# Patient Record
Sex: MALE | Race: WHITE | NOT HISPANIC OR LATINO | Employment: FULL TIME | URBAN - METROPOLITAN AREA
[De-identification: names, ages, dates, MRNs, and addresses within clinical notes are randomized per-mention and may not be internally consistent; named-entity substitution may affect disease eponyms.]

---

## 2019-12-06 ENCOUNTER — CLINICAL SUPPORT (OUTPATIENT)
Dept: CARDIOLOGY | Facility: HOSPITAL | Age: 69
DRG: 246 | End: 2019-12-06
Attending: FAMILY MEDICINE
Payer: MEDICARE

## 2019-12-06 ENCOUNTER — HOSPITAL ENCOUNTER (INPATIENT)
Facility: HOSPITAL | Age: 69
LOS: 4 days | Discharge: HOME | DRG: 246 | End: 2019-12-10
Attending: EMERGENCY MEDICINE | Admitting: FAMILY MEDICINE
Payer: MEDICARE

## 2019-12-06 VITALS — BODY MASS INDEX: 44.1 KG/M2 | HEIGHT: 71 IN | WEIGHT: 315 LBS

## 2019-12-06 DIAGNOSIS — R07.9 CHEST PAIN: ICD-10-CM

## 2019-12-06 DIAGNOSIS — I24.9 ACS (ACUTE CORONARY SYNDROME): ICD-10-CM

## 2019-12-06 DIAGNOSIS — I21.4 NSTEMI (NON-ST ELEVATED MYOCARDIAL INFARCTION): ICD-10-CM

## 2019-12-06 DIAGNOSIS — R07.9 CHEST PAIN, UNSPECIFIED TYPE: Primary | ICD-10-CM

## 2019-12-06 PROBLEM — E78.5 HYPERLIPIDEMIA: Chronic | Status: ACTIVE | Noted: 2019-12-06

## 2019-12-06 PROBLEM — K57.90 DIVERTICULOSIS: Chronic | Status: ACTIVE | Noted: 2019-12-06

## 2019-12-06 PROBLEM — G47.33 OSA ON CPAP: Chronic | Status: ACTIVE | Noted: 2019-12-06

## 2019-12-06 PROBLEM — E11.9 DM (DIABETES MELLITUS), TYPE 2: Chronic | Status: ACTIVE | Noted: 2019-12-06

## 2019-12-06 PROBLEM — G89.29 CHRONIC LOWER BACK PAIN: Chronic | Status: ACTIVE | Noted: 2019-12-06

## 2019-12-06 PROBLEM — I87.2 VENOUS INSUFFICIENCY: Chronic | Status: ACTIVE | Noted: 2019-12-06

## 2019-12-06 PROBLEM — Z95.1 S/P CABG (CORONARY ARTERY BYPASS GRAFT): Chronic | Status: ACTIVE | Noted: 2019-12-06

## 2019-12-06 PROBLEM — E66.01 MORBID OBESITY: Chronic | Status: ACTIVE | Noted: 2019-12-06

## 2019-12-06 PROBLEM — M54.50 CHRONIC LOWER BACK PAIN: Chronic | Status: ACTIVE | Noted: 2019-12-06

## 2019-12-06 PROBLEM — I10 HYPERTENSION: Chronic | Status: ACTIVE | Noted: 2019-12-06

## 2019-12-06 PROBLEM — I25.10 CAD (CORONARY ARTERY DISEASE): Chronic | Status: ACTIVE | Noted: 2019-12-06

## 2019-12-06 PROBLEM — Z90.5 H/O RIGHT NEPHRECTOMY: Chronic | Status: ACTIVE | Noted: 2019-12-06

## 2019-12-06 LAB
ALBUMIN SERPL BCP-MCNC: 4.1 G/DL (ref 3.5–5.2)
ALP SERPL-CCNC: 44 U/L (ref 55–135)
ALT SERPL W/O P-5'-P-CCNC: 22 U/L (ref 10–44)
ANION GAP SERPL CALC-SCNC: 9 MMOL/L (ref 8–16)
AORTIC ROOT ANNULUS: 3.42 CM
AORTIC VALVE CUSP SEPERATION: 2.2 CM
AST SERPL-CCNC: 18 U/L (ref 10–40)
AV INDEX (PROSTH): 0.88
AV MEAN GRADIENT: 7 MMHG
AV PEAK GRADIENT: 13 MMHG
AV VALVE AREA: 4.27 CM2
AV VELOCITY RATIO: 80.42
BASOPHILS # BLD AUTO: 0.06 K/UL (ref 0–0.2)
BASOPHILS NFR BLD: 0.6 % (ref 0–1.9)
BILIRUB SERPL-MCNC: 0.9 MG/DL (ref 0.1–1)
BNP SERPL-MCNC: 32 PG/ML (ref 0–99)
BSA FOR ECHO PROCEDURE: 2.69 M2
BUN SERPL-MCNC: 16 MG/DL (ref 8–23)
CALCIUM SERPL-MCNC: 9.2 MG/DL (ref 8.7–10.5)
CHLORIDE SERPL-SCNC: 102 MMOL/L (ref 95–110)
CO2 SERPL-SCNC: 24 MMOL/L (ref 23–29)
CREAT SERPL-MCNC: 0.9 MG/DL (ref 0.5–1.4)
CV ECHO LV RWT: 0.54 CM
DIFFERENTIAL METHOD: ABNORMAL
DOP CALC AO PEAK VEL: 1.78 M/S
DOP CALC AO VTI: 36.07 CM
DOP CALC LVOT AREA: 4.9 CM2
DOP CALC LVOT DIAMETER: 2.49 CM
DOP CALC LVOT PEAK VEL: 143.15 M/S
DOP CALC LVOT STROKE VOLUME: 154.09 CM3
DOP CALCLVOT PEAK VEL VTI: 31.66 CM
E WAVE DECELERATION TIME: 219.63 MSEC
E/A RATIO: 1.14
E/E' RATIO: 10.47 M/S
ECHO LV POSTERIOR WALL: 1.41 CM (ref 0.6–1.1)
EOSINOPHIL # BLD AUTO: 0.3 K/UL (ref 0–0.5)
EOSINOPHIL NFR BLD: 2.7 % (ref 0–8)
ERYTHROCYTE [DISTWIDTH] IN BLOOD BY AUTOMATED COUNT: 12.8 % (ref 11.5–14.5)
EST. GFR  (AFRICAN AMERICAN): >60 ML/MIN/1.73 M^2
EST. GFR  (NON AFRICAN AMERICAN): >60 ML/MIN/1.73 M^2
FRACTIONAL SHORTENING: 30 % (ref 28–44)
GLUCOSE SERPL-MCNC: 157 MG/DL (ref 70–110)
GLUCOSE SERPL-MCNC: 161 MG/DL (ref 70–110)
GLUCOSE SERPL-MCNC: 173 MG/DL (ref 70–110)
GLUCOSE SERPL-MCNC: 195 MG/DL (ref 70–110)
HCT VFR BLD AUTO: 34 % (ref 40–54)
HGB BLD-MCNC: 11.6 G/DL (ref 14–18)
IMM GRANULOCYTES # BLD AUTO: 0.07 K/UL (ref 0–0.04)
IMM GRANULOCYTES NFR BLD AUTO: 0.7 % (ref 0–0.5)
INR PPP: 1
INTERVENTRICULAR SEPTUM: 1.41 CM (ref 0.6–1.1)
IVRT: 78.73 MSEC
LEFT ATRIUM SIZE: 5.11 CM
LEFT INTERNAL DIMENSION IN SYSTOLE: 3.66 CM (ref 2.1–4)
LEFT VENTRICLE MASS INDEX: 123 G/M2
LEFT VENTRICULAR INTERNAL DIMENSION IN DIASTOLE: 5.23 CM (ref 3.5–6)
LEFT VENTRICULAR MASS: 315.6 G
LV LATERAL E/E' RATIO: 8.9 M/S
LV SEPTAL E/E' RATIO: 12.71 M/S
LYMPHOCYTES # BLD AUTO: 2.1 K/UL (ref 1–4.8)
LYMPHOCYTES NFR BLD: 21.4 % (ref 18–48)
MCH RBC QN AUTO: 32.3 PG (ref 27–31)
MCHC RBC AUTO-ENTMCNC: 34.1 G/DL (ref 32–36)
MCV RBC AUTO: 95 FL (ref 82–98)
MONOCYTES # BLD AUTO: 0.8 K/UL (ref 0.3–1)
MONOCYTES NFR BLD: 7.6 % (ref 4–15)
MV PEAK A VEL: 0.78 M/S
MV PEAK E VEL: 0.89 M/S
NEUTROPHILS # BLD AUTO: 6.7 K/UL (ref 1.8–7.7)
NEUTROPHILS NFR BLD: 67 % (ref 38–73)
NRBC BLD-RTO: 0 /100 WBC
PISA TR MAX VEL: 2.48 M/S
PLATELET # BLD AUTO: 216 K/UL (ref 150–350)
PMV BLD AUTO: 10.5 FL (ref 9.2–12.9)
POTASSIUM SERPL-SCNC: 4.4 MMOL/L (ref 3.5–5.1)
PROT SERPL-MCNC: 7.1 G/DL (ref 6–8.4)
PROTHROMBIN TIME: 13 SEC (ref 10.6–14.8)
PV PEAK VELOCITY: 134.93 CM/S
RBC # BLD AUTO: 3.59 M/UL (ref 4.6–6.2)
RIGHT VENTRICULAR END-DIASTOLIC DIMENSION: 200 CM
SODIUM SERPL-SCNC: 135 MMOL/L (ref 136–145)
T4 FREE SERPL-MCNC: 0.56 NG/DL (ref 0.71–1.51)
TDI LATERAL: 0.1 M/S
TDI SEPTAL: 0.07 M/S
TDI: 0.09 M/S
TR MAX PG: 25 MMHG
TROPONIN I SERPL DL<=0.01 NG/ML-MCNC: 0.17 NG/ML (ref 0.02–0.04)
TROPONIN I SERPL DL<=0.01 NG/ML-MCNC: 0.55 NG/ML (ref 0.02–0.04)
TROPONIN I SERPL DL<=0.01 NG/ML-MCNC: 0.56 NG/ML (ref 0.02–0.04)
TSH SERPL DL<=0.005 MIU/L-ACNC: 2.58 UIU/ML (ref 0.34–5.6)
WBC # BLD AUTO: 9.95 K/UL (ref 3.9–12.7)

## 2019-12-06 PROCEDURE — 80053 COMPREHEN METABOLIC PANEL: CPT

## 2019-12-06 PROCEDURE — 99285 EMERGENCY DEPT VISIT HI MDM: CPT | Mod: 25

## 2019-12-06 PROCEDURE — 84484 ASSAY OF TROPONIN QUANT: CPT

## 2019-12-06 PROCEDURE — C9399 UNCLASSIFIED DRUGS OR BIOLOG: HCPCS | Performed by: FAMILY MEDICINE

## 2019-12-06 PROCEDURE — 36415 COLL VENOUS BLD VENIPUNCTURE: CPT

## 2019-12-06 PROCEDURE — 94761 N-INVAS EAR/PLS OXIMETRY MLT: CPT

## 2019-12-06 PROCEDURE — 25000003 PHARM REV CODE 250: Performed by: EMERGENCY MEDICINE

## 2019-12-06 PROCEDURE — 83880 ASSAY OF NATRIURETIC PEPTIDE: CPT

## 2019-12-06 PROCEDURE — 63600175 PHARM REV CODE 636 W HCPCS: Performed by: FAMILY MEDICINE

## 2019-12-06 PROCEDURE — 93005 ELECTROCARDIOGRAM TRACING: CPT

## 2019-12-06 PROCEDURE — 63600175 PHARM REV CODE 636 W HCPCS: Performed by: EMERGENCY MEDICINE

## 2019-12-06 PROCEDURE — 85025 COMPLETE CBC W/AUTO DIFF WBC: CPT

## 2019-12-06 PROCEDURE — 84439 ASSAY OF FREE THYROXINE: CPT

## 2019-12-06 PROCEDURE — 25000003 PHARM REV CODE 250: Performed by: FAMILY MEDICINE

## 2019-12-06 PROCEDURE — 84484 ASSAY OF TROPONIN QUANT: CPT | Mod: 91

## 2019-12-06 PROCEDURE — 93306 TTE W/DOPPLER COMPLETE: CPT

## 2019-12-06 PROCEDURE — 85610 PROTHROMBIN TIME: CPT

## 2019-12-06 PROCEDURE — 84443 ASSAY THYROID STIM HORMONE: CPT

## 2019-12-06 PROCEDURE — 21400001 HC TELEMETRY ROOM

## 2019-12-06 PROCEDURE — 83036 HEMOGLOBIN GLYCOSYLATED A1C: CPT

## 2019-12-06 PROCEDURE — 99900035 HC TECH TIME PER 15 MIN (STAT)

## 2019-12-06 RX ORDER — ERGOCALCIFEROL 1.25 MG/1
50000 CAPSULE ORAL
COMMUNITY

## 2019-12-06 RX ORDER — DOCUSATE SODIUM 100 MG/1
100 CAPSULE, LIQUID FILLED ORAL DAILY PRN
Status: DISCONTINUED | OUTPATIENT
Start: 2019-12-06 | End: 2019-12-10 | Stop reason: HOSPADM

## 2019-12-06 RX ORDER — ENOXAPARIN SODIUM 100 MG/ML
40 INJECTION SUBCUTANEOUS EVERY 12 HOURS
Status: DISCONTINUED | OUTPATIENT
Start: 2019-12-06 | End: 2019-12-10 | Stop reason: HOSPADM

## 2019-12-06 RX ORDER — POTASSIUM CHLORIDE 20 MEQ/15ML
40 SOLUTION ORAL
Status: DISCONTINUED | OUTPATIENT
Start: 2019-12-06 | End: 2019-12-10 | Stop reason: HOSPADM

## 2019-12-06 RX ORDER — LISINOPRIL 10 MG/1
10 TABLET ORAL DAILY
Status: DISCONTINUED | OUTPATIENT
Start: 2019-12-06 | End: 2019-12-10 | Stop reason: HOSPADM

## 2019-12-06 RX ORDER — FERROUS SULFATE 325(65) MG
325 TABLET ORAL DAILY
COMMUNITY

## 2019-12-06 RX ORDER — DOCUSATE SODIUM 100 MG/1
100 CAPSULE, LIQUID FILLED ORAL DAILY PRN
COMMUNITY
Start: 2018-01-08

## 2019-12-06 RX ORDER — SODIUM,POTASSIUM PHOSPHATES 280-250MG
2 POWDER IN PACKET (EA) ORAL
Status: DISCONTINUED | OUTPATIENT
Start: 2019-12-06 | End: 2019-12-10 | Stop reason: HOSPADM

## 2019-12-06 RX ORDER — IBUPROFEN 200 MG
24 TABLET ORAL
Status: DISCONTINUED | OUTPATIENT
Start: 2019-12-06 | End: 2019-12-10 | Stop reason: HOSPADM

## 2019-12-06 RX ORDER — GLIMEPIRIDE 4 MG/1
4 TABLET ORAL 2 TIMES DAILY
COMMUNITY
Start: 2019-08-05

## 2019-12-06 RX ORDER — PRAVASTATIN SODIUM 20 MG/1
20 TABLET ORAL DAILY
COMMUNITY
Start: 2019-08-05

## 2019-12-06 RX ORDER — PANTOPRAZOLE SODIUM 40 MG/1
40 TABLET, DELAYED RELEASE ORAL
Status: DISCONTINUED | OUTPATIENT
Start: 2019-12-06 | End: 2019-12-10 | Stop reason: HOSPADM

## 2019-12-06 RX ORDER — GLUCAGON 1 MG
1 KIT INJECTION
Status: DISCONTINUED | OUTPATIENT
Start: 2019-12-06 | End: 2019-12-10 | Stop reason: HOSPADM

## 2019-12-06 RX ORDER — ENOXAPARIN SODIUM 100 MG/ML
40 INJECTION SUBCUTANEOUS EVERY 24 HOURS
Status: DISCONTINUED | OUTPATIENT
Start: 2019-12-06 | End: 2019-12-06

## 2019-12-06 RX ORDER — ACETAMINOPHEN 325 MG/1
650 TABLET ORAL EVERY 8 HOURS PRN
Status: DISCONTINUED | OUTPATIENT
Start: 2019-12-06 | End: 2019-12-08

## 2019-12-06 RX ORDER — IBUPROFEN 200 MG
16 TABLET ORAL
Status: DISCONTINUED | OUTPATIENT
Start: 2019-12-06 | End: 2019-12-10 | Stop reason: HOSPADM

## 2019-12-06 RX ORDER — GLUCOSAMINE/CHONDR SU A SOD 167-133 MG
500 CAPSULE ORAL DAILY
COMMUNITY

## 2019-12-06 RX ORDER — ONDANSETRON 2 MG/ML
4 INJECTION INTRAMUSCULAR; INTRAVENOUS EVERY 8 HOURS PRN
Status: DISCONTINUED | OUTPATIENT
Start: 2019-12-06 | End: 2019-12-10 | Stop reason: HOSPADM

## 2019-12-06 RX ORDER — ASPIRIN 325 MG
325 TABLET ORAL DAILY
Status: ON HOLD | COMMUNITY
End: 2019-12-10 | Stop reason: HOSPADM

## 2019-12-06 RX ORDER — INSULIN GLARGINE 100 [IU]/ML
50 INJECTION, SOLUTION SUBCUTANEOUS NIGHTLY
COMMUNITY
Start: 2019-08-06

## 2019-12-06 RX ORDER — SODIUM CHLORIDE 450 MG/100ML
1000 INJECTION, SOLUTION INTRAVENOUS
Status: COMPLETED | OUTPATIENT
Start: 2019-12-06 | End: 2019-12-06

## 2019-12-06 RX ORDER — TRAZODONE HYDROCHLORIDE 50 MG/1
50 TABLET ORAL NIGHTLY
COMMUNITY

## 2019-12-06 RX ORDER — DOXEPIN 6 MG/1
6 TABLET, FILM COATED ORAL NIGHTLY PRN
COMMUNITY

## 2019-12-06 RX ORDER — PRAVASTATIN SODIUM 20 MG/1
20 TABLET ORAL NIGHTLY
Status: DISCONTINUED | OUTPATIENT
Start: 2019-12-06 | End: 2019-12-10 | Stop reason: HOSPADM

## 2019-12-06 RX ORDER — LANOLIN ALCOHOL/MO/W.PET/CERES
800 CREAM (GRAM) TOPICAL
Status: DISCONTINUED | OUTPATIENT
Start: 2019-12-06 | End: 2019-12-10 | Stop reason: HOSPADM

## 2019-12-06 RX ORDER — HYDROCODONE BITARTRATE AND ACETAMINOPHEN 7.5; 325 MG/1; MG/1
1 TABLET ORAL EVERY 12 HOURS PRN
Status: DISCONTINUED | OUTPATIENT
Start: 2019-12-06 | End: 2019-12-08

## 2019-12-06 RX ORDER — POTASSIUM CHLORIDE 20 MEQ/15ML
20 SOLUTION ORAL
Status: DISCONTINUED | OUTPATIENT
Start: 2019-12-06 | End: 2019-12-10 | Stop reason: HOSPADM

## 2019-12-06 RX ORDER — LISINOPRIL 10 MG/1
10 TABLET ORAL DAILY
Status: ON HOLD | COMMUNITY
Start: 2019-08-05 | End: 2019-12-10 | Stop reason: HOSPADM

## 2019-12-06 RX ORDER — INSULIN ASPART 100 [IU]/ML
1-10 INJECTION, SOLUTION INTRAVENOUS; SUBCUTANEOUS
Status: DISCONTINUED | OUTPATIENT
Start: 2019-12-06 | End: 2019-12-10 | Stop reason: HOSPADM

## 2019-12-06 RX ORDER — SODIUM CHLORIDE 0.9 % (FLUSH) 0.9 %
2 SYRINGE (ML) INJECTION
Status: DISCONTINUED | OUTPATIENT
Start: 2019-12-06 | End: 2019-12-10 | Stop reason: HOSPADM

## 2019-12-06 RX ORDER — IPRATROPIUM BROMIDE AND ALBUTEROL SULFATE 2.5; .5 MG/3ML; MG/3ML
3 SOLUTION RESPIRATORY (INHALATION) EVERY 6 HOURS PRN
Status: DISCONTINUED | OUTPATIENT
Start: 2019-12-06 | End: 2019-12-10 | Stop reason: HOSPADM

## 2019-12-06 RX ORDER — NAPROXEN SODIUM 220 MG/1
324 TABLET, FILM COATED ORAL ONCE
Status: DISCONTINUED | OUTPATIENT
Start: 2019-12-06 | End: 2019-12-06

## 2019-12-06 RX ORDER — ASPIRIN 325 MG
325 TABLET ORAL DAILY
Status: DISCONTINUED | OUTPATIENT
Start: 2019-12-07 | End: 2019-12-10 | Stop reason: HOSPADM

## 2019-12-06 RX ORDER — POLYETHYLENE GLYCOL 3350 17 G/17G
17 POWDER, FOR SOLUTION ORAL DAILY
Status: DISCONTINUED | OUTPATIENT
Start: 2019-12-06 | End: 2019-12-10 | Stop reason: HOSPADM

## 2019-12-06 RX ORDER — PHENOL/SODIUM PHENOLATE
20 AEROSOL, SPRAY (ML) MUCOUS MEMBRANE DAILY
COMMUNITY
Start: 2019-08-05

## 2019-12-06 RX ORDER — ASPIRIN 325 MG
325 TABLET ORAL
Status: COMPLETED | OUTPATIENT
Start: 2019-12-06 | End: 2019-12-06

## 2019-12-06 RX ORDER — HYDROCODONE BITARTRATE AND ACETAMINOPHEN 7.5; 325 MG/1; MG/1
1 TABLET ORAL EVERY 12 HOURS PRN
COMMUNITY

## 2019-12-06 RX ORDER — ACETAMINOPHEN 325 MG/1
650 TABLET ORAL EVERY 4 HOURS PRN
Status: DISCONTINUED | OUTPATIENT
Start: 2019-12-06 | End: 2019-12-08

## 2019-12-06 RX ADMIN — NITROGLYCERIN 0.5 INCH: 20 OINTMENT TOPICAL at 09:12

## 2019-12-06 RX ADMIN — HYDROCODONE BITARTRATE AND ACETAMINOPHEN 1 TABLET: 7.5; 325 TABLET ORAL at 09:12

## 2019-12-06 RX ADMIN — ASPIRIN 325 MG ORAL TABLET 325 MG: 325 PILL ORAL at 09:12

## 2019-12-06 RX ADMIN — POLYETHYLENE GLYCOL 3350 17 G: 17 POWDER, FOR SOLUTION ORAL at 03:12

## 2019-12-06 RX ADMIN — PRAVASTATIN SODIUM 20 MG: 20 TABLET ORAL at 09:12

## 2019-12-06 RX ADMIN — LISINOPRIL 10 MG: 10 TABLET ORAL at 03:12

## 2019-12-06 RX ADMIN — INSULIN ASPART 2 UNITS: 100 INJECTION, SOLUTION INTRAVENOUS; SUBCUTANEOUS at 04:12

## 2019-12-06 RX ADMIN — SODIUM CHLORIDE 1000 ML: 0.45 INJECTION, SOLUTION INTRAVENOUS at 09:12

## 2019-12-06 RX ADMIN — PANTOPRAZOLE SODIUM 40 MG: 40 TABLET, DELAYED RELEASE ORAL at 03:12

## 2019-12-06 RX ADMIN — ENOXAPARIN SODIUM 40 MG: 100 INJECTION SUBCUTANEOUS at 09:12

## 2019-12-06 RX ADMIN — INSULIN ASPART 1 UNITS: 100 INJECTION, SOLUTION INTRAVENOUS; SUBCUTANEOUS at 09:12

## 2019-12-06 RX ADMIN — INSULIN DETEMIR 10 UNITS: 100 INJECTION, SOLUTION SUBCUTANEOUS at 09:12

## 2019-12-06 NOTE — PROGRESS NOTES
Pharmacist Renal Dose Adjustment Note    Pelon Lazo is a 69 y.o. male being treated with the medication Lovenox    Patient Data:    Vital Signs (Most Recent):  Temp: 98.2 °F (36.8 °C) (12/06/19 0828)  Pulse: 70 (12/06/19 0952)  Resp: 16 (12/06/19 0952)  BP: (!) 123/58 (12/06/19 0931)  SpO2: 98 % (12/06/19 0952)   Vital Signs (72h Range):  Temp:  [98.2 °F (36.8 °C)]   Pulse:  [68-85]   Resp:  [16-34]   BP: (110-131)/(56-63)   SpO2:  [93 %-99 %]      Recent Labs   Lab 12/06/19 0656   CREATININE 0.9     Serum creatinine: 0.9 mg/dL 12/06/19 0656  Estimated creatinine clearance: 117.9 mL/min    Medication: Lovenox 40 mg SQ dose: every 24 hours frequency will be changed to medication: Lovenox 40 mg SQ dose: every 12 hours frequency for BMI > 40.    Pharmacist's Name: Leonila Glover  Pharmacist's Extension: [phone]

## 2019-12-06 NOTE — Clinical Note
Catheter is inserted into the ostial  SVG . Angiography performed of the right coronary arteries in multiple views. Angiography performed via power injection with 6 mL contrast at 3 mL/s. SVG to OM1, giving collaterals to the RCA

## 2019-12-06 NOTE — ASSESSMENT & PLAN NOTE
Trend CE  ASA, statin  Lipid, HbA1C, TSH/T4 pending  2D echo pending  Cardio consulted, Dr. Mckeon.  Case discussed with Dr. Mckeon  cw home meds  CPAP qhs

## 2019-12-06 NOTE — HPI
69-year-old male history CAD S/P CABG (2001), morbid obesity, GABBY on CPAP, s/p right nephrectomy, type 2 diabetes comes in for chest pain. Patient reports that he was in his usual health yesterday evening without acute signs of illness.  He suddenly woke up about 3:00 a.m. this morning with 5/10 left-sided nonradiating sharp chest pain that lasted for several minutes.  No aggravating factors.  Reports that pain mildly improved we stood up and went to the bathroom.  Endorses some shortness of breath with the chest pain however patient is on CPAP at night and has baseline shortness of breath when he removes his CPAP. Patient was able to go back to bed if fall back asleep after several minutes.  Reports similar symptoms about 4:00 a.m. in which he trended complex asleep again.  Symptoms occurred again at 5:00 a.m. in patient became concerned and came to the ED for further evaluation.  Of note, patient reports that he does not have hypertension, hyperlipidemia.  Patient takes blood pressure medications for cardiac and renal protective.  Patient is not taking a statin over 2 months because he forgot to  a refill.    In the ED, patient's symptoms resolved prior to come to ED.  Has that report chest pain since being in the ED.  Patient received aspirin and nitro paste.  Cardiology, Dr. Mckeon was consulted.  Initial troponins were 0.171.  EKG found no acute ST elevation or depression.

## 2019-12-06 NOTE — ED PROVIDER NOTES
Encounter Date: 12/6/2019       History     Chief Complaint   Patient presents with    Chest Pain     69-year-old male who has a history of a sleep apnea on CPAP, coronary artery disease at and 2 vessel CABG in 2001, and who had renal CA with a partial right 9 nephrectomy, presents emergency room with a history that he was awakened from sleep at 3:30 a.m. this morning with left precordial chest pain he describes as tight and sharp in nature.  No radiation to his pain. No similar past pain.  He did not take anything for the pain thus far.  The patient did have some shortness of breath but no nausea vomiting or diaphoresis.  He denies any changes recently in his exercise tolerance.  No fever chills or cough.        Review of patient's allergies indicates:  No Known Allergies  No past medical history on file.  No past surgical history on file.  No family history on file.  Social History     Tobacco Use    Smoking status: Not on file   Substance Use Topics    Alcohol use: Not on file    Drug use: Not on file     Review of Systems   Constitutional: Negative for diaphoresis and fever.   HENT: Negative for congestion, ear pain, rhinorrhea, sinus pressure, sinus pain, sore throat and trouble swallowing.    Eyes: Negative for pain.   Respiratory: Positive for shortness of breath. Negative for cough and wheezing.    Cardiovascular: Negative for chest pain and palpitations.   Gastrointestinal: Negative for abdominal pain, constipation, diarrhea, nausea and vomiting.   Genitourinary: Negative for difficulty urinating, flank pain, frequency and hematuria.   Skin: Negative for pallor and rash.   Neurological: Negative for headaches.   All other systems reviewed and are negative.      Physical Exam     Initial Vitals   BP Pulse Resp Temp SpO2   12/06/19 0646 12/06/19 0646 12/06/19 0646 12/06/19 0828 12/06/19 0646   (!) 130/59 85 20 98.2 °F (36.8 °C) 99 %      MAP       --                Physical Exam    Constitutional: He  appears well-developed and well-nourished. He is not diaphoretic. No distress.   HENT:   Head: Normocephalic and atraumatic.   Right Ear: External ear normal.   Left Ear: External ear normal.   Nose: Nose normal.   Mouth/Throat: Oropharynx is clear and moist.   Eyes: Conjunctivae and EOM are normal. Pupils are equal, round, and reactive to light. Right eye exhibits no discharge. Left eye exhibits no discharge. No scleral icterus.   Neck: Normal range of motion. Neck supple. No tracheal deviation present. No JVD present.   Cardiovascular: Normal rate, regular rhythm, normal heart sounds and intact distal pulses. Exam reveals no gallop and no friction rub.    No murmur heard.  Pulmonary/Chest: Breath sounds normal. No respiratory distress. He has no wheezes. He has no rhonchi. He has no rales. He exhibits no tenderness.   Abdominal: Soft. Bowel sounds are normal. He exhibits no distension and no mass. There is no tenderness. There is no rebound and no guarding.   Genitourinary: Penis normal.   Musculoskeletal: Normal range of motion. He exhibits no edema or tenderness.   Lymphadenopathy:     He has no cervical adenopathy.   Neurological: He is alert and oriented to person, place, and time. He has normal strength and normal reflexes. No cranial nerve deficit or sensory deficit. GCS score is 15. GCS eye subscore is 4. GCS verbal subscore is 5. GCS motor subscore is 6.   Skin: Skin is warm and dry. Capillary refill takes less than 2 seconds. No rash noted. No erythema. No pallor.   Psychiatric: He has a normal mood and affect. His behavior is normal. Judgment and thought content normal.         ED Course   Procedures  Labs Reviewed   CBC W/ AUTO DIFFERENTIAL - Abnormal; Notable for the following components:       Result Value    RBC 3.59 (*)     Hemoglobin 11.6 (*)     Hematocrit 34.0 (*)     Mean Corpuscular Hemoglobin 32.3 (*)     Immature Granulocytes 0.7 (*)     Immature Grans (Abs) 0.07 (*)     All other components  within normal limits   COMPREHENSIVE METABOLIC PANEL - Abnormal; Notable for the following components:    Sodium 135 (*)     Glucose 195 (*)     Alkaline Phosphatase 44 (*)     All other components within normal limits   TROPONIN I - Abnormal; Notable for the following components:    Troponin I 0.171 (*)     All other components within normal limits    Narrative:     Trop   result(s) called and verbal readback obtained from Marguerite Santillan RN/ER by DWAYNE 12/06/2019 08:15   B-TYPE NATRIURETIC PEPTIDE   PROTIME-INR        ECG Results          EKG 12-lead (In process)  Result time 12/06/19 07:15:05    In process by Interface, Lab In [facility] (12/06/19 07:15:05)                 Narrative:    Test Reason : R07.9,    Vent. Rate : 071 BPM     Atrial Rate : 071 BPM     P-R Int : 180 ms          QRS Dur : 100 ms      QT Int : 446 ms       P-R-T Axes : 039 015 081 degrees     QTc Int : 484 ms    Normal sinus rhythm  Nonspecific ST and T wave abnormality  Prolonged QT  Abnormal ECG  No previous ECGs available    Referred By: [id number]   SELF           Confirmed By:                             Imaging Results          X-Ray Chest AP Portable (Final result)  Result time 12/06/19 07:46:47    Final result by Quinton Gonzalez MD (12/06/19 07:46:47)                 Impression:      Stable chest radiograph.  No acute pulmonary process.      Electronically signed by: Quinton Gonzalez MD  Date:    12/06/2019  Time:    07:46             Narrative:    EXAMINATION:  XR CHEST AP PORTABLE    CLINICAL HISTORY:  cest pain;    COMPARISON:  04/11/2016    FINDINGS:  Cardiac silhouette size is borderline enlarged and stable from prior.  Mildly prominent central pulmonary vascularity.  Patient is status post median sternotomy.    No confluent airspace disease.  No large pleural effusion or pneumothorax.  No acute osseous abnormality.                                              Attending Attestation:             Attending ED Notes:   EKG shows sinus  rhythm with no evidence of any acute injury or ischemia.  Labs were remarkable for an elevated blood sugar as well as troponin.  During the ED course the patient remained pain-free.  I did speak to Dr. Mckeon, his cardiologist who agreed with admission by Hospital Medicine and he will see the patient in consultation.  During ED course the patient was begun on hydration, topical nitrates and given aspirin.                        Clinical Impression:       ICD-10-CM ICD-9-CM   1. Chest pain, unspecified type R07.9 786.50   2. Chest pain R07.9 786.50                             Janes Moreno Jr., MD  12/06/19 0932

## 2019-12-06 NOTE — Clinical Note
Catheter is inserted into the ostial  LIMA graft . Angiography performed of the graft in multiple views. Angiography performed via power injection with 8 mL contrast at 4 mL/s. LIMA to LAD

## 2019-12-06 NOTE — SUBJECTIVE & OBJECTIVE
No past medical history on file.    No past surgical history on file.    Review of patient's allergies indicates:  No Known Allergies    No current facility-administered medications on file prior to encounter.      Current Outpatient Medications on File Prior to Encounter   Medication Sig    aspirin 325 MG tablet Take 325 mg by mouth once daily.    docusate sodium (COLACE) 100 MG capsule Take 100 mg by mouth daily as needed.    doxepin (SILENOR) 6 mg Tab Take 6 mg by mouth nightly as needed.    ferrous sulfate (FEOSOL) 325 mg (65 mg iron) Tab tablet Take 325 mg by mouth once daily.    glimepiride (AMARYL) 4 MG tablet Take 4 mg by mouth 2 (two) times daily.    insulin glargine 100 units/mL (3mL) SubQ pen Inject 10 Units into the skin every evening.    lisinopril 10 MG tablet Take 10 mg by mouth once daily.    niacin 500 MG Tab Take 500 mg by mouth once daily.    omeprazole 20 mg TbEC Take 20 mg by mouth once daily.    pravastatin (PRAVACHOL) 20 MG tablet Take 20 mg by mouth once daily.    traZODone (DESYREL) 50 MG tablet Take 50 mg by mouth every evening. Patient states he takes 4mg.    ergocalciferol (VITAMIN D2) 50,000 unit Cap Take 50,000 Units by mouth every 7 days.    HYDROcodone-acetaminophen (NORCO) 7.5-325 mg per tablet Take 1 tablet by mouth every 12 (twelve) hours as needed for Pain.     Family History     None        Tobacco Use    Smoking status: Not on file   Substance and Sexual Activity    Alcohol use: Not on file    Drug use: Not on file    Sexual activity: Not on file     Review of Systems   Constitutional: Negative for chills, fatigue, fever and unexpected weight change.   HENT: Negative for ear pain, rhinorrhea, sneezing and sore throat.    Eyes: Negative for visual disturbance.   Respiratory: Positive for shortness of breath. Negative for cough and chest tightness.    Cardiovascular: Positive for chest pain.   Gastrointestinal: Negative for abdominal pain, constipation, diarrhea,  nausea and vomiting.   Endocrine: Negative for polyuria.   Genitourinary: Negative for dysuria and hematuria.   Neurological: Negative for seizures and headaches.     Objective:     Vital Signs (Most Recent):  Temp: 98.2 °F (36.8 °C) (12/06/19 0828)  Pulse: 70 (12/06/19 0952)  Resp: 16 (12/06/19 0952)  BP: (!) 123/58 (12/06/19 0931)  SpO2: 98 % (12/06/19 0952) Vital Signs (24h Range):  Temp:  [98.2 °F (36.8 °C)] 98.2 °F (36.8 °C)  Pulse:  [68-85] 70  Resp:  [16-34] 16  SpO2:  [93 %-99 %] 98 %  BP: (110-131)/(56-63) 123/58     Weight: (!) 148.8 kg (328 lb)  Body mass index is 45.75 kg/m².    Physical Exam   Constitutional: He is oriented to person, place, and time. He appears well-developed and well-nourished. No distress.   Morbid obesity   HENT:   Head: Normocephalic and atraumatic.   Right Ear: External ear normal.   Left Ear: External ear normal.   Nose: Nose normal.   Mouth/Throat: Oropharynx is clear and moist. No oropharyngeal exudate.   Eyes: Pupils are equal, round, and reactive to light. Conjunctivae and EOM are normal. Right eye exhibits no discharge. Left eye exhibits no discharge. No scleral icterus.   Neck: Normal range of motion. Neck supple. No thyromegaly present.   Cardiovascular: Normal rate, regular rhythm, normal heart sounds and intact distal pulses. Exam reveals no gallop and no friction rub.   No murmur heard.  Pulmonary/Chest: Effort normal and breath sounds normal. No respiratory distress. He has no wheezes. He has no rales. He exhibits no tenderness.   Abdominal: Soft. Bowel sounds are normal. He exhibits no distension and no mass. There is no tenderness. There is no rebound and no guarding. No hernia.   Musculoskeletal: Normal range of motion. He exhibits edema (+1 R LE, +2 L LE). He exhibits no tenderness.   Lymphadenopathy:     He has no cervical adenopathy.   Neurological: He is alert and oriented to person, place, and time.   Skin: Skin is warm. He is not diaphoretic.   Psychiatric: He  has a normal mood and affect. His behavior is normal. Judgment and thought content normal.   Nursing note and vitals reviewed.        CRANIAL NERVES     CN III, IV, VI   Pupils are equal, round, and reactive to light.  Extraocular motions are normal.        Significant Labs:   CBC:   Recent Labs   Lab 12/06/19  0656   WBC 9.95   HGB 11.6*   HCT 34.0*        CMP:   Recent Labs   Lab 12/06/19  0656   *   K 4.4      CO2 24   *   BUN 16   CREATININE 0.9   CALCIUM 9.2   PROT 7.1   ALBUMIN 4.1   BILITOT 0.9   ALKPHOS 44*   AST 18   ALT 22   ANIONGAP 9   EGFRNONAA >60.0     Troponin:   Recent Labs   Lab 12/06/19  0656   TROPONINI 0.171*       Significant Imaging:     CXR 1 view  Impression:       Stable chest radiograph.  No acute pulmonary process.

## 2019-12-06 NOTE — CONSULTS
"[facility]  Cardiology  Consult    Patient Name: Pelon Lazo  MRN: [id number]  Admission Date: 12/6/2019  Code Status: Full Code   Attending Provider: Osvaldo Boyer MD   Primary Care Physician: Heather Lauren NP  Principal Problem:NSTEMI (non-ST elevated myocardial infarction)    Patient information was obtained from patient and ER records.     Subjective:     Chief Complaint:   Chest Pain    HPI:     Mr. Lazo is a 69-year-old male with a past medical history significant for CAD status post CABG in 2001, morbid obesity, GABBY on CPAP, diabetes, hypertension says his right extremity.  He was followed by Dr. Mckeon but has not followed up in over 7 years.  She has been in his normal state of health until last night around 3:00 a.m. he was awakened by a sharp chest pain that was left-sided nonradiating.  It did go away when he got upset about this.  She went back to sleep at night on the right side a few hours later this happened again and therefore he came to the ER for evaluation. He denies any lightheadedness or shortness of breath or diaphoresis  associated with that. He is chest pain free currently. Troponin is 0.171 EKG is nonspecific.         " 69-year-old male history CAD S/P CABG (2001), morbid obesity, GABBY on CPAP, s/p right nephrectomy, type 2 diabetes comes in for chest pain. Patient reports that he was in his usual health yesterday evening without acute signs of illness.  He suddenly woke up about 3:00 a.m. this morning with 5/10 left-sided nonradiating sharp chest pain that lasted for several minutes.  No aggravating factors.  Reports that pain mildly improved we stood up and went to the bathroom.  Endorses some shortness of breath with the chest pain however patient is on CPAP at night and has baseline shortness of breath when he removes his CPAP. Patient was able to go back to bed if fall back asleep after several minutes.  Reports similar symptoms about 4:00 a.m. in which " "he trended complex asleep again.  Symptoms occurred again at 5:00 a.m. in patient became concerned and came to the ED for further evaluation.  Of note, patient reports that he does not have hypertension, hyperlipidemia.  Patient takes blood pressure medications for cardiac and renal protective.  Patient is not taking a statin over 2 months because he forgot to  a refill.   In the ED, patient's symptoms resolved prior to come to ED.  Has that report chest pain since being in the ED.  Patient received aspirin and nitro paste.  Cardiology, Dr. Mckeon was consulted.  Initial troponins were 0.171.  EKG found no acute ST elevation or depression."    No past medical history on file.    No past surgical history on file.    Review of patient's allergies indicates:  No Known Allergies    No current facility-administered medications on file prior to encounter.      Current Outpatient Medications on File Prior to Encounter   Medication Sig    aspirin 325 MG tablet Take 325 mg by mouth once daily.    docusate sodium (COLACE) 100 MG capsule Take 100 mg by mouth daily as needed.    doxepin (SILENOR) 6 mg Tab Take 6 mg by mouth nightly as needed.    ferrous sulfate (FEOSOL) 325 mg (65 mg iron) Tab tablet Take 325 mg by mouth once daily.    glimepiride (AMARYL) 4 MG tablet Take 4 mg by mouth 2 (two) times daily.    insulin glargine 100 units/mL (3mL) SubQ pen Inject 10 Units into the skin every evening.    lisinopril 10 MG tablet Take 10 mg by mouth once daily.    niacin 500 MG Tab Take 500 mg by mouth once daily.    omeprazole 20 mg TbEC Take 20 mg by mouth once daily.    pravastatin (PRAVACHOL) 20 MG tablet Take 20 mg by mouth once daily.    traZODone (DESYREL) 50 MG tablet Take 50 mg by mouth every evening. Patient states he takes 4mg.    ergocalciferol (VITAMIN D2) 50,000 unit Cap Take 50,000 Units by mouth every 7 days.    HYDROcodone-acetaminophen (NORCO) 7.5-325 mg per tablet Take 1 tablet by mouth every " 12 (twelve) hours as needed for Pain.     Family History     None        Tobacco Use    Smoking status: Not on file   Substance and Sexual Activity    Alcohol use: Not on file    Drug use: Not on file    Sexual activity: Not on file       REVIEW OF SYSTEMS:    Constitutional: Negative for chills, fatigue and fever.   Eyes: No double vision, No blurred vision  Neuro: No headaches, No dizziness  Respiratory: Negative for cough, shortness of breath and wheezing.    Cardiovascular:+ Chest Pain   Gastrointestinal: Negative for abdominal pain, No melena, diarrhea, nausea and vomiting.   Genitourinary: Negative for dysuria and frequency, Negative for hematuria  Skin: Negative for bruising, Negative for edema or discoloration noted.   Endocrine: Negative for polyphagia, Negative for heat intolerance, Negative for cold intolerance  Psychiatric: Negative for depression, Negative for anxiety, Negative for memory loss  Musculoskeletal: Negative for neck pain, Negative for muscle weakness, Negative for back pain     Objective:     Vital Signs (Most Recent):  Temp: 98.2 °F (36.8 °C) (12/06/19 1102)  Pulse: 72 (12/06/19 1102)  Resp: (!) 21 (12/06/19 1047)  BP: 113/63 (12/06/19 1101)  SpO2: 95 % (12/06/19 1047) Vital Signs (24h Range):  Temp:  [98.2 °F (36.8 °C)] 98.2 °F (36.8 °C)  Pulse:  [68-85] 72  Resp:  [16-34] 21  SpO2:  [93 %-99 %] 95 %  BP: (110-131)/(56-63) 113/63     Weight: (!) 148.8 kg (328 lb)  Body mass index is 45.75 kg/m².    SpO2: 95 %  O2 Device (Oxygen Therapy): room air    No intake or output data in the 24 hours ending 12/06/19 1129    Lines/Drains/Airways     Peripheral Intravenous Line                 Peripheral IV - Single Lumen 12/06/19 0653 20 G Left Antecubital less than 1 day                PHYSICAL EXAM:    GENERAL: well built, well nourished, well-developed obese in no apparent distress alert and oriented.   HEENT: Normocephalic. Pupils normal and conjunctivae normal.  Mucous membranes normal, no  cyanosis or icterus, trachea central,no pallor or icterus is noted..   NECK: No JVD. No bruit..   THYROID: Thyroid not enlarged. No nodules present..   CARDIAC: Regular rate and rhythm. S1 is normal.S2 is normal.No gallops, clicks or murmurs noted at this time.  CHEST ANATOMY: normal.   LUNGS: Clear to auscultation. No wheezing or rhonchi..    ABDOMEN: Soft no masses or organomegaly.  No abdomen pulsations or bruits.  Normal bowel sounds. No pulsations and no masses felt, No guarding or rebound.   URINARY: No blood catheter   EXTREMITIES: No cyanosis, clubbing or edema noted at this time., no calf tenderness bilaterally.   PERIPHERAL VASCULAR SYSTEM: Good palpable distal pulses.   CENTRAL NERVOUS SYSTEM: No focal motor or sensory deficits noted.   SKIN: Skin without lesions, moist, well perfused.   MUSCLE STRENGTH & TONE: No noteable weakness, atrophy or abnormal movement.       Significant Labs:   Results for CARRIE HERNANDEZ (MRN [id number]) as of 12/6/2019 11:29   Ref. Range 12/6/2019 06:56 12/6/2019 07:20   WBC Latest Ref Range: 3.90 - 12.70 K/uL 9.95    RBC Latest Ref Range: 4.60 - 6.20 M/uL 3.59 (L)    Hemoglobin Latest Ref Range: 14.0 - 18.0 g/dL 11.6 (L)    Hematocrit Latest Ref Range: 40.0 - 54.0 % 34.0 (L)    MCV Latest Ref Range: 82 - 98 fL 95    MCH Latest Ref Range: 27.0 - 31.0 pg 32.3 (H)    MCHC Latest Ref Range: 32.0 - 36.0 g/dL 34.1    RDW Latest Ref Range: 11.5 - 14.5 % 12.8    Platelets Latest Ref Range: 150 - 350 K/uL 216    MPV Latest Ref Range: 9.2 - 12.9 fL 10.5    Gran% Latest Ref Range: 38.0 - 73.0 % 67.0    Gran # (ANC) Latest Ref Range: 1.8 - 7.7 K/uL 6.7    Lymph% Latest Ref Range: 18.0 - 48.0 % 21.4    Lymph # Latest Ref Range: 1.0 - 4.8 K/uL 2.1    Mono% Latest Ref Range: 4.0 - 15.0 % 7.6    Mono # Latest Ref Range: 0.3 - 1.0 K/uL 0.8    Eosinophil% Latest Ref Range: 0.0 - 8.0 % 2.7    Eos # Latest Ref Range: 0.0 - 0.5 K/uL 0.3    Basophil% Latest Ref Range: 0.0 - 1.9 % 0.6    Baso #  Latest Ref Range: 0.00 - 0.20 K/uL 0.06    nRBC Latest Ref Range: 0 /100 WBC 0    Differential Method Unknown Automated    Immature Grans (Abs) Latest Ref Range: 0.00 - 0.04 K/uL 0.07 (H)    Immature Granulocytes Latest Ref Range: 0.0 - 0.5 % 0.7 (H)    Coumadin Monitoring INR Unknown 1.0    PT Latest Ref Range: 10.6 - 14.8 sec 13.0    Sodium Latest Ref Range: 136 - 145 mmol/L 135 (L)    Potassium Latest Ref Range: 3.5 - 5.1 mmol/L 4.4    Chloride Latest Ref Range: 95 - 110 mmol/L 102    CO2 Latest Ref Range: 23 - 29 mmol/L 24    Anion Gap Latest Ref Range: 8 - 16 mmol/L 9    BUN, Bld Latest Ref Range: 8 - 23 mg/dL 16    Creatinine Latest Ref Range: 0.5 - 1.4 mg/dL 0.9    eGFR if non African American Latest Ref Range: >60 mL/min/1.73 m^2 >60.0    eGFR if African American Latest Ref Range: >60 mL/min/1.73 m^2 >60.0    Glucose Latest Ref Range: 70 - 110 mg/dL 195 (H)    Calcium Latest Ref Range: 8.7 - 10.5 mg/dL 9.2    Alkaline Phosphatase Latest Ref Range: 55 - 135 U/L 44 (L)    PROTEIN TOTAL Latest Ref Range: 6.0 - 8.4 g/dL 7.1    Albumin Latest Ref Range: 3.5 - 5.2 g/dL 4.1    BILIRUBIN TOTAL Latest Ref Range: 0.1 - 1.0 mg/dL 0.9    AST Latest Ref Range: 10 - 40 U/L 18    ALT Latest Ref Range: 10 - 44 U/L 22    BNP Latest Ref Range: 0 - 99 pg/mL 32    Troponin I Latest Ref Range: 0.020 - 0.040 ng/mL 0.171 (HH)    XR CHEST AP PORTABLE Unknown  Rpt       Significant Imaging:     Narrative     EXAMINATION:  XR CHEST AP PORTABLE    CLINICAL HISTORY:  cest pain;    COMPARISON:  04/11/2016    FINDINGS:  Cardiac silhouette size is borderline enlarged and stable from prior.  Mildly prominent central pulmonary vascularity.  Patient is status post median sternotomy.    No confluent airspace disease.  No large pleural effusion or pneumothorax.  No acute osseous abnormality.      Impression       Stable chest radiograph.  No acute pulmonary process.     Test Reason : R07.9,    Vent. Rate : 071 BPM     Atrial Rate : 071 BPM      P-R Int : 180 ms          QRS Dur : 100 ms      QT Int : 446 ms       P-R-T Axes : 039 015 081 degrees     QTc Int : 484 ms    Normal sinus rhythm  Nonspecific ST and T wave abnormality  Prolonged QT  Abnormal ECG  No previous ECGs available  Confirmed by Kaiden Camargo MD ([id number]) on 12/6/2019 11:17:41 AM  I HAVE REVIEWED :    The vital signs, nurses notes, and all the pertinent radiology and labs.     Assessment and Plan:     1. Elevated Troponin and CP- NSTEMI? Chest pain free currently, EKG nothing acute  2. H.O CAD S/P CABG  3. DM  4. Dyslipidemia  5. HTN  6. Obesity    PLAN:  2 day lexiscan myoview rest scan today  ECHO  Dr. Camargo to follow over the weekend  Add PPI agents   Continue statin and ASA  Thamk you for the consultation         Active Diagnoses:    Diagnosis Date Noted POA    PRINCIPAL PROBLEM:  NSTEMI (non-ST elevated myocardial infarction) [I21.4] 12/06/2019 Yes    S/P CABG (coronary artery bypass graft) [Z95.1] 12/06/2019 Not Applicable     Chronic    DM (diabetes mellitus), type 2 [E11.9] 12/06/2019 Yes     Chronic    Hypertension [I10] 12/06/2019 Yes     Chronic    Morbid obesity [E66.01] 12/06/2019 Yes     Chronic    CAD (coronary artery disease) [I25.10] 12/06/2019 Yes     Chronic    Diverticulosis [K57.90] 12/06/2019 Yes     Chronic    Venous insufficiency [I87.2] 12/06/2019 Yes     Chronic    Hyperlipidemia [E78.5] 12/06/2019 Yes     Chronic    Chronic lower back pain [M54.5, G89.29] 12/06/2019 Yes     Chronic    H/O right nephrectomy [Z90.5] 12/06/2019 Not Applicable     Chronic    GABBY on CPAP [G47.33, Z99.89] 12/06/2019 Not Applicable     Chronic      Problems Resolved During this Admission:           VTE Risk Mitigation (From admission, onward)         Ordered     enoxaparin injection 40 mg  Every 12 hours      12/06/19 1046     IP VTE HIGH RISK PATIENT  Once      12/06/19 1027                Carmen Baxter NP  Cardiology   [facility]      I have  personally interviewed and examined the patient, I have reviewed the Nurse Practitioner's history and physical, assessment, and plan. I have personally evaluated the patient at bedside and agree with the findings.

## 2019-12-06 NOTE — ED NOTES
States woke up with CP and SOB.  States pain free at present.  NSR. No RD. 99% RA O2 sats.  Alert, oriented and ambulatory.  Monitoring in progress.  MD at BS.

## 2019-12-06 NOTE — H&P
[facility] Medicine  History & Physical    Patient Name: Pelon Lazo  MRN: [id number]  Admission Date: 12/6/2019  Attending Physician: Osvaldo Boyer MD   Primary Care Provider: Heather Lauren NP         Patient information was obtained from patient, spouse/SO, past medical records and ER records.     Subjective:     Principal Problem:NSTEMI (non-ST elevated myocardial infarction)    Chief Complaint:   Chief Complaint   Patient presents with    Chest Pain        HPI: 69-year-old male history CAD S/P CABG (2001), morbid obesity, GABBY on CPAP, s/p right nephrectomy, type 2 diabetes comes in for chest pain. Patient reports that he was in his usual health yesterday evening without acute signs of illness.  He suddenly woke up about 3:00 a.m. this morning with 5/10 left-sided nonradiating sharp chest pain that lasted for several minutes.  No aggravating factors.  Reports that pain mildly improved we stood up and went to the bathroom.  Endorses some shortness of breath with the chest pain however patient is on CPAP at night and has baseline shortness of breath when he removes his CPAP. Patient was able to go back to bed if fall back asleep after several minutes.  Reports similar symptoms about 4:00 a.m. in which he trended complex asleep again.  Symptoms occurred again at 5:00 a.m. in patient became concerned and came to the ED for further evaluation.  Of note, patient reports that he does not have hypertension, hyperlipidemia.  Patient takes blood pressure medications for cardiac and renal protective.  Patient is not taking a statin over 2 months because he forgot to  a refill.    In the ED, patient's symptoms resolved prior to come to ED.  Has that report chest pain since being in the ED.  Patient received aspirin and nitro paste.  Cardiology, Dr. Mckeon was consulted.  Initial troponins were 0.171.  EKG found no acute ST elevation or depression.    No past medical history on  file.    No past surgical history on file.    Review of patient's allergies indicates:  No Known Allergies    No current facility-administered medications on file prior to encounter.      Current Outpatient Medications on File Prior to Encounter   Medication Sig    aspirin 325 MG tablet Take 325 mg by mouth once daily.    docusate sodium (COLACE) 100 MG capsule Take 100 mg by mouth daily as needed.    doxepin (SILENOR) 6 mg Tab Take 6 mg by mouth nightly as needed.    ferrous sulfate (FEOSOL) 325 mg (65 mg iron) Tab tablet Take 325 mg by mouth once daily.    glimepiride (AMARYL) 4 MG tablet Take 4 mg by mouth 2 (two) times daily.    insulin glargine 100 units/mL (3mL) SubQ pen Inject 10 Units into the skin every evening.    lisinopril 10 MG tablet Take 10 mg by mouth once daily.    niacin 500 MG Tab Take 500 mg by mouth once daily.    omeprazole 20 mg TbEC Take 20 mg by mouth once daily.    pravastatin (PRAVACHOL) 20 MG tablet Take 20 mg by mouth once daily.    traZODone (DESYREL) 50 MG tablet Take 50 mg by mouth every evening. Patient states he takes 4mg.    ergocalciferol (VITAMIN D2) 50,000 unit Cap Take 50,000 Units by mouth every 7 days.    HYDROcodone-acetaminophen (NORCO) 7.5-325 mg per tablet Take 1 tablet by mouth every 12 (twelve) hours as needed for Pain.     Family History     None        Tobacco Use    Smoking status: Not on file   Substance and Sexual Activity    Alcohol use: Not on file    Drug use: Not on file    Sexual activity: Not on file     Review of Systems   Constitutional: Negative for chills, fatigue, fever and unexpected weight change.   HENT: Negative for ear pain, rhinorrhea, sneezing and sore throat.    Eyes: Negative for visual disturbance.   Respiratory: Positive for shortness of breath. Negative for cough and chest tightness.    Cardiovascular: Positive for chest pain.   Gastrointestinal: Negative for abdominal pain, constipation, diarrhea, nausea and vomiting.    Endocrine: Negative for polyuria.   Genitourinary: Negative for dysuria and hematuria.   Neurological: Negative for seizures and headaches.     Objective:     Vital Signs (Most Recent):  Temp: 98.2 °F (36.8 °C) (12/06/19 0828)  Pulse: 70 (12/06/19 0952)  Resp: 16 (12/06/19 0952)  BP: (!) 123/58 (12/06/19 0931)  SpO2: 98 % (12/06/19 0952) Vital Signs (24h Range):  Temp:  [98.2 °F (36.8 °C)] 98.2 °F (36.8 °C)  Pulse:  [68-85] 70  Resp:  [16-34] 16  SpO2:  [93 %-99 %] 98 %  BP: (110-131)/(56-63) 123/58     Weight: (!) 148.8 kg (328 lb)  Body mass index is 45.75 kg/m².    Physical Exam   Constitutional: He is oriented to person, place, and time. He appears well-developed and well-nourished. No distress.   Morbid obesity   HENT:   Head: Normocephalic and atraumatic.   Right Ear: External ear normal.   Left Ear: External ear normal.   Nose: Nose normal.   Mouth/Throat: Oropharynx is clear and moist. No oropharyngeal exudate.   Eyes: Pupils are equal, round, and reactive to light. Conjunctivae and EOM are normal. Right eye exhibits no discharge. Left eye exhibits no discharge. No scleral icterus.   Neck: Normal range of motion. Neck supple. No thyromegaly present.   Cardiovascular: Normal rate, regular rhythm, normal heart sounds and intact distal pulses. Exam reveals no gallop and no friction rub.   No murmur heard.  Pulmonary/Chest: Effort normal and breath sounds normal. No respiratory distress. He has no wheezes. He has no rales. He exhibits no tenderness.   Abdominal: Soft. Bowel sounds are normal. He exhibits no distension and no mass. There is no tenderness. There is no rebound and no guarding. No hernia.   Musculoskeletal: Normal range of motion. He exhibits edema (+1 R LE, +2 L LE). He exhibits no tenderness.   Lymphadenopathy:     He has no cervical adenopathy.   Neurological: He is alert and oriented to person, place, and time.   Skin: Skin is warm. He is not diaphoretic.   Psychiatric: He has a normal mood  and affect. His behavior is normal. Judgment and thought content normal.   Nursing note and vitals reviewed.        CRANIAL NERVES     CN III, IV, VI   Pupils are equal, round, and reactive to light.  Extraocular motions are normal.        Significant Labs:   CBC:   Recent Labs   Lab 12/06/19  0656   WBC 9.95   HGB 11.6*   HCT 34.0*        CMP:   Recent Labs   Lab 12/06/19  0656   *   K 4.4      CO2 24   *   BUN 16   CREATININE 0.9   CALCIUM 9.2   PROT 7.1   ALBUMIN 4.1   BILITOT 0.9   ALKPHOS 44*   AST 18   ALT 22   ANIONGAP 9   EGFRNONAA >60.0     Troponin:   Recent Labs   Lab 12/06/19  0656   TROPONINI 0.171*       Significant Imaging:     CXR 1 view  Impression:       Stable chest radiograph.  No acute pulmonary process.         Assessment/Plan:     Active Hospital Problems    Diagnosis    *NSTEMI (non-ST elevated myocardial infarction)    S/P CABG (coronary artery bypass graft)    DM (diabetes mellitus), type 2    Hypertension    Morbid obesity    CAD (coronary artery disease)    Diverticulosis    Venous insufficiency    Hyperlipidemia    Chronic lower back pain    H/O right nephrectomy    GABBY on CPAP       * NSTEMI (non-ST elevated myocardial infarction)  Trend CE  ASA, statin  Lipid, HbA1C, TSH/T4 pending  2D echo pending  Cardio consulted, Dr. Mckeon.  Case discussed with Dr. Mckeon  cw home meds  CPAP qhs        VTE Risk Mitigation (From admission, onward)         Ordered     enoxaparin injection 40 mg  Every 12 hours      12/06/19 1046     IP VTE HIGH RISK PATIENT  Once      12/06/19 1027                   Osvaldo Boyer MD  Department of Hospital Medicine   [facility]  Date of service: 12/06/2019 11:04 AM

## 2019-12-07 ENCOUNTER — CLINICAL SUPPORT (OUTPATIENT)
Dept: CARDIOLOGY | Facility: HOSPITAL | Age: 69
DRG: 246 | End: 2019-12-07
Attending: EMERGENCY MEDICINE
Payer: MEDICARE

## 2019-12-07 LAB
ANION GAP SERPL CALC-SCNC: 8 MMOL/L (ref 8–16)
ANION GAP SERPL CALC-SCNC: 9 MMOL/L (ref 8–16)
BUN SERPL-MCNC: 15 MG/DL (ref 8–23)
BUN SERPL-MCNC: 16 MG/DL (ref 8–23)
CALCIUM SERPL-MCNC: 8.6 MG/DL (ref 8.7–10.5)
CALCIUM SERPL-MCNC: 9.4 MG/DL (ref 8.7–10.5)
CHLORIDE SERPL-SCNC: 103 MMOL/L (ref 95–110)
CHLORIDE SERPL-SCNC: 104 MMOL/L (ref 95–110)
CHOLEST SERPL-MCNC: 135 MG/DL (ref 120–199)
CHOLEST/HDLC SERPL: 6.1 {RATIO} (ref 2–5)
CO2 SERPL-SCNC: 23 MMOL/L (ref 23–29)
CO2 SERPL-SCNC: 24 MMOL/L (ref 23–29)
CREAT SERPL-MCNC: 0.8 MG/DL (ref 0.5–1.4)
CREAT SERPL-MCNC: 0.9 MG/DL (ref 0.5–1.4)
ERYTHROCYTE [DISTWIDTH] IN BLOOD BY AUTOMATED COUNT: 12.9 % (ref 11.5–14.5)
EST. GFR  (AFRICAN AMERICAN): >60 ML/MIN/1.73 M^2
EST. GFR  (AFRICAN AMERICAN): >60 ML/MIN/1.73 M^2
EST. GFR  (NON AFRICAN AMERICAN): >60 ML/MIN/1.73 M^2
EST. GFR  (NON AFRICAN AMERICAN): >60 ML/MIN/1.73 M^2
GLUCOSE SERPL-MCNC: 167 MG/DL (ref 70–110)
GLUCOSE SERPL-MCNC: 179 MG/DL (ref 70–110)
GLUCOSE SERPL-MCNC: 184 MG/DL (ref 70–110)
GLUCOSE SERPL-MCNC: 190 MG/DL (ref 70–110)
GLUCOSE SERPL-MCNC: 192 MG/DL (ref 70–110)
GLUCOSE SERPL-MCNC: 241 MG/DL (ref 70–110)
HCT VFR BLD AUTO: 30.6 % (ref 40–54)
HDLC SERPL-MCNC: 22 MG/DL (ref 40–75)
HDLC SERPL: 16.3 % (ref 20–50)
HGB BLD-MCNC: 10.3 G/DL (ref 14–18)
LDLC SERPL CALC-MCNC: 72 MG/DL (ref 63–159)
MAGNESIUM SERPL-MCNC: 1.7 MG/DL (ref 1.6–2.6)
MCH RBC QN AUTO: 32 PG (ref 27–31)
MCHC RBC AUTO-ENTMCNC: 33.7 G/DL (ref 32–36)
MCV RBC AUTO: 95 FL (ref 82–98)
NONHDLC SERPL-MCNC: 113 MG/DL
PHOSPHATE SERPL-MCNC: 3.5 MG/DL (ref 2.7–4.5)
PLATELET # BLD AUTO: 231 K/UL (ref 150–350)
PMV BLD AUTO: 9.9 FL (ref 9.2–12.9)
POTASSIUM SERPL-SCNC: 4.1 MMOL/L (ref 3.5–5.1)
POTASSIUM SERPL-SCNC: 4.4 MMOL/L (ref 3.5–5.1)
RBC # BLD AUTO: 3.22 M/UL (ref 4.6–6.2)
SODIUM SERPL-SCNC: 135 MMOL/L (ref 136–145)
SODIUM SERPL-SCNC: 136 MMOL/L (ref 136–145)
TRIGL SERPL-MCNC: 205 MG/DL (ref 30–150)
WBC # BLD AUTO: 9.78 K/UL (ref 3.9–12.7)

## 2019-12-07 PROCEDURE — 36415 COLL VENOUS BLD VENIPUNCTURE: CPT

## 2019-12-07 PROCEDURE — 25000003 PHARM REV CODE 250: Performed by: STUDENT IN AN ORGANIZED HEALTH CARE EDUCATION/TRAINING PROGRAM

## 2019-12-07 PROCEDURE — 85027 COMPLETE CBC AUTOMATED: CPT

## 2019-12-07 PROCEDURE — 94761 N-INVAS EAR/PLS OXIMETRY MLT: CPT

## 2019-12-07 PROCEDURE — 80048 BASIC METABOLIC PNL TOTAL CA: CPT

## 2019-12-07 PROCEDURE — 99900035 HC TECH TIME PER 15 MIN (STAT)

## 2019-12-07 PROCEDURE — 93017 CV STRESS TEST TRACING ONLY: CPT

## 2019-12-07 PROCEDURE — C9399 UNCLASSIFIED DRUGS OR BIOLOG: HCPCS | Performed by: FAMILY MEDICINE

## 2019-12-07 PROCEDURE — 25000003 PHARM REV CODE 250: Performed by: FAMILY MEDICINE

## 2019-12-07 PROCEDURE — 84100 ASSAY OF PHOSPHORUS: CPT

## 2019-12-07 PROCEDURE — 82962 GLUCOSE BLOOD TEST: CPT

## 2019-12-07 PROCEDURE — 25000003 PHARM REV CODE 250: Performed by: INTERNAL MEDICINE

## 2019-12-07 PROCEDURE — 21400001 HC TELEMETRY ROOM

## 2019-12-07 PROCEDURE — 63600175 PHARM REV CODE 636 W HCPCS: Performed by: EMERGENCY MEDICINE

## 2019-12-07 PROCEDURE — 63600175 PHARM REV CODE 636 W HCPCS: Performed by: FAMILY MEDICINE

## 2019-12-07 PROCEDURE — 80048 BASIC METABOLIC PNL TOTAL CA: CPT | Mod: 91

## 2019-12-07 PROCEDURE — 83735 ASSAY OF MAGNESIUM: CPT

## 2019-12-07 PROCEDURE — 80061 LIPID PANEL: CPT

## 2019-12-07 RX ORDER — LORAZEPAM 1 MG/1
1 TABLET ORAL ONCE
Status: COMPLETED | OUTPATIENT
Start: 2019-12-08 | End: 2019-12-08

## 2019-12-07 RX ORDER — TIZANIDINE 4 MG/1
4 TABLET ORAL ONCE
Status: COMPLETED | OUTPATIENT
Start: 2019-12-07 | End: 2019-12-07

## 2019-12-07 RX ORDER — REGADENOSON 0.08 MG/ML
0.4 INJECTION, SOLUTION INTRAVENOUS
Status: COMPLETED | OUTPATIENT
Start: 2019-12-07 | End: 2019-12-07

## 2019-12-07 RX ORDER — TIZANIDINE 4 MG/1
4 TABLET ORAL EVERY 8 HOURS PRN
Status: DISCONTINUED | OUTPATIENT
Start: 2019-12-07 | End: 2019-12-10 | Stop reason: HOSPADM

## 2019-12-07 RX ORDER — HYDROCODONE BITARTRATE AND ACETAMINOPHEN 5; 325 MG/1; MG/1
1 TABLET ORAL ONCE
Status: COMPLETED | OUTPATIENT
Start: 2019-12-07 | End: 2019-12-07

## 2019-12-07 RX ORDER — SODIUM CHLORIDE 450 MG/100ML
INJECTION, SOLUTION INTRAVENOUS CONTINUOUS
Status: DISCONTINUED | OUTPATIENT
Start: 2019-12-07 | End: 2019-12-08

## 2019-12-07 RX ORDER — OXYCODONE AND ACETAMINOPHEN 5; 325 MG/1; MG/1
1 TABLET ORAL EVERY 4 HOURS PRN
Status: DISCONTINUED | OUTPATIENT
Start: 2019-12-07 | End: 2019-12-10 | Stop reason: HOSPADM

## 2019-12-07 RX ADMIN — HYDROCODONE BITARTRATE AND ACETAMINOPHEN 1 TABLET: 5; 325 TABLET ORAL at 02:12

## 2019-12-07 RX ADMIN — LISINOPRIL 10 MG: 10 TABLET ORAL at 08:12

## 2019-12-07 RX ADMIN — INSULIN DETEMIR 10 UNITS: 100 INJECTION, SOLUTION SUBCUTANEOUS at 10:12

## 2019-12-07 RX ADMIN — PANTOPRAZOLE SODIUM 40 MG: 40 TABLET, DELAYED RELEASE ORAL at 03:12

## 2019-12-07 RX ADMIN — PRAVASTATIN SODIUM 20 MG: 20 TABLET ORAL at 10:12

## 2019-12-07 RX ADMIN — MAGNESIUM OXIDE 800 MG: 400 TABLET ORAL at 06:12

## 2019-12-07 RX ADMIN — TIZANIDINE 4 MG: 4 TABLET ORAL at 02:12

## 2019-12-07 RX ADMIN — ASPIRIN 325 MG ORAL TABLET 325 MG: 325 PILL ORAL at 10:12

## 2019-12-07 RX ADMIN — INSULIN ASPART 4 UNITS: 100 INJECTION, SOLUTION INTRAVENOUS; SUBCUTANEOUS at 12:12

## 2019-12-07 RX ADMIN — REGADENOSON 0.4 MG: 0.08 INJECTION, SOLUTION INTRAVENOUS at 08:12

## 2019-12-07 RX ADMIN — OXYCODONE AND ACETAMINOPHEN 1 TABLET: 5; 325 TABLET ORAL at 10:12

## 2019-12-07 RX ADMIN — INSULIN ASPART 1 UNITS: 100 INJECTION, SOLUTION INTRAVENOUS; SUBCUTANEOUS at 10:12

## 2019-12-07 RX ADMIN — SODIUM CHLORIDE: 0.45 INJECTION, SOLUTION INTRAVENOUS at 10:12

## 2019-12-07 RX ADMIN — ENOXAPARIN SODIUM 40 MG: 100 INJECTION SUBCUTANEOUS at 10:12

## 2019-12-07 RX ADMIN — TIZANIDINE 4 MG: 4 TABLET ORAL at 10:12

## 2019-12-07 RX ADMIN — POLYETHYLENE GLYCOL 3350 17 G: 17 POWDER, FOR SOLUTION ORAL at 10:12

## 2019-12-07 NOTE — ASSESSMENT & PLAN NOTE
Stress test results pending  ASA, statin  HbA1C pending  2D echo EF 60%  Cardio on board  cw home meds  CPAP qhs

## 2019-12-07 NOTE — SUBJECTIVE & OBJECTIVE
Interval History:  Patient currently sitting up in chair.  Denies any chest pain, shortness of breath.  Feels well.  Complains of back pain.  Started patient on muscle relaxers and pain medications.  Patient will have 2nd part of stress test today.    Review of Systems   Constitutional: Negative for chills, fatigue, fever and unexpected weight change.   HENT: Negative for sore throat.    Eyes: Negative for visual disturbance.   Respiratory: Negative for cough, chest tightness and shortness of breath.    Cardiovascular: Negative for chest pain.   Gastrointestinal: Negative for abdominal pain, constipation, diarrhea, nausea and vomiting.   Endocrine: Negative for polyuria.   Genitourinary: Negative for dysuria and hematuria.   Neurological: Negative for headaches.     Objective:     Vital Signs (Most Recent):  Temp: 97.6 °F (36.4 °C) (12/07/19 0705)  Pulse: (!) 111 (12/07/19 1119)  Resp: 18 (12/07/19 0705)  BP: (!) 113/50 (12/07/19 0705)  SpO2: 96 % (12/07/19 1119) Vital Signs (24h Range):  Temp:  [97.6 °F (36.4 °C)-98.8 °F (37.1 °C)] 97.6 °F (36.4 °C)  Pulse:  [] 111  Resp:  [18-20] 18  SpO2:  [92 %-99 %] 96 %  BP: ()/(50-59) 113/50     Weight: (!) 144.4 kg (318 lb 5.5 oz)  Body mass index is 44.4 kg/m².    Intake/Output Summary (Last 24 hours) at 12/7/2019 1136  Last data filed at 12/6/2019 1344  Gross per 24 hour   Intake 240 ml   Output 250 ml   Net -10 ml      Physical Exam   Constitutional: He is oriented to person, place, and time. He appears well-developed and well-nourished. No distress.   Morbid obesity   HENT:   Head: Normocephalic and atraumatic.   Right Ear: External ear normal.   Left Ear: External ear normal.   Eyes: Conjunctivae and EOM are normal. Right eye exhibits no discharge. Left eye exhibits no discharge. No scleral icterus.   Neck: Normal range of motion.   Cardiovascular: Normal rate, regular rhythm and normal heart sounds. Exam reveals no gallop and no friction rub.   No murmur  heard.  Pulmonary/Chest: Effort normal and breath sounds normal. No respiratory distress. He has no wheezes. He has no rales. He exhibits no tenderness.   Musculoskeletal: He exhibits edema (+1 R LE, +2 L LE). He exhibits no tenderness.   Neurological: He is alert and oriented to person, place, and time.   Skin: Skin is warm. He is not diaphoretic.   Psychiatric: He has a normal mood and affect. His behavior is normal. Judgment and thought content normal.   Nursing note and vitals reviewed.      Significant Labs:   CBC:   Recent Labs   Lab 12/06/19  0656 12/07/19  0431   WBC 9.95 9.78   HGB 11.6* 10.3*   HCT 34.0* 30.6*    231     CMP:   Recent Labs   Lab 12/06/19 0656 12/07/19  0431   * 136   K 4.4 4.1    104   CO2 24 23   * 184*   BUN 16 15   CREATININE 0.9 0.8   CALCIUM 9.2 8.6*   PROT 7.1  --    ALBUMIN 4.1  --    BILITOT 0.9  --    ALKPHOS 44*  --    AST 18  --    ALT 22  --    ANIONGAP 9 9   EGFRNONAA >60.0 >60.0     Troponin:   Recent Labs   Lab 12/06/19  0656 12/06/19  1305 12/06/19  1912   TROPONINI 0.171* 0.552* 0.559*       Significant Imaging: I have reviewed all pertinent imaging results/findings within the past 24 hours.

## 2019-12-07 NOTE — PROGRESS NOTES
[facility] Medicine  Progress Note    Patient Name: Pelon Lazo  MRN: [id number]  Patient Class: IP- Inpatient   Admission Date: 12/6/2019  Length of Stay: 1 days  Attending Physician: Osvaldo Boyer MD  Primary Care Provider: Heather Lauren NP        Subjective:     Principal Problem:NSTEMI (non-ST elevated myocardial infarction)      Interval History:  Patient currently sitting up in chair.  Denies any chest pain, shortness of breath.  Feels well.  Complains of back pain.  Started patient on muscle relaxers and pain medications.  Patient will have 2nd part of stress test today.    Review of Systems   Constitutional: Negative for chills, fatigue, fever and unexpected weight change.   HENT: Negative for sore throat.    Eyes: Negative for visual disturbance.   Respiratory: Negative for cough, chest tightness and shortness of breath.    Cardiovascular: Negative for chest pain.   Gastrointestinal: Negative for abdominal pain, constipation, diarrhea, nausea and vomiting.   Endocrine: Negative for polyuria.   Genitourinary: Negative for dysuria and hematuria.   Neurological: Negative for headaches.     Objective:     Vital Signs (Most Recent):  Temp: 97.6 °F (36.4 °C) (12/07/19 0705)  Pulse: (!) 111 (12/07/19 1119)  Resp: 18 (12/07/19 0705)  BP: (!) 113/50 (12/07/19 0705)  SpO2: 96 % (12/07/19 1119) Vital Signs (24h Range):  Temp:  [97.6 °F (36.4 °C)-98.8 °F (37.1 °C)] 97.6 °F (36.4 °C)  Pulse:  [] 111  Resp:  [18-20] 18  SpO2:  [92 %-99 %] 96 %  BP: ()/(50-59) 113/50     Weight: (!) 144.4 kg (318 lb 5.5 oz)  Body mass index is 44.4 kg/m².    Intake/Output Summary (Last 24 hours) at 12/7/2019 1136  Last data filed at 12/6/2019 1344  Gross per 24 hour   Intake 240 ml   Output 250 ml   Net -10 ml      Physical Exam   Constitutional: He is oriented to person, place, and time. He appears well-developed and well-nourished. No distress.   Morbid obesity   HENT:   Head:  Normocephalic and atraumatic.   Right Ear: External ear normal.   Left Ear: External ear normal.   Eyes: Conjunctivae and EOM are normal. Right eye exhibits no discharge. Left eye exhibits no discharge. No scleral icterus.   Neck: Normal range of motion.   Cardiovascular: Normal rate, regular rhythm and normal heart sounds. Exam reveals no gallop and no friction rub.   No murmur heard.  Pulmonary/Chest: Effort normal and breath sounds normal. No respiratory distress. He has no wheezes. He has no rales. He exhibits no tenderness.   Musculoskeletal: He exhibits edema (+1 R LE, +2 L LE). He exhibits no tenderness.   Neurological: He is alert and oriented to person, place, and time.   Skin: Skin is warm. He is not diaphoretic.   Psychiatric: He has a normal mood and affect. His behavior is normal. Judgment and thought content normal.   Nursing note and vitals reviewed.      Significant Labs:   CBC:   Recent Labs   Lab 12/06/19  0656 12/07/19  0431   WBC 9.95 9.78   HGB 11.6* 10.3*   HCT 34.0* 30.6*    231     CMP:   Recent Labs   Lab 12/06/19  0656 12/07/19  0431   * 136   K 4.4 4.1    104   CO2 24 23   * 184*   BUN 16 15   CREATININE 0.9 0.8   CALCIUM 9.2 8.6*   PROT 7.1  --    ALBUMIN 4.1  --    BILITOT 0.9  --    ALKPHOS 44*  --    AST 18  --    ALT 22  --    ANIONGAP 9 9   EGFRNONAA >60.0 >60.0     Troponin:   Recent Labs   Lab 12/06/19  0656 12/06/19  1305 12/06/19  1912   TROPONINI 0.171* 0.552* 0.559*       Significant Imaging: I have reviewed all pertinent imaging results/findings within the past 24 hours.      Assessment/Plan:      Active Hospital Problems    Diagnosis    *NSTEMI (non-ST elevated myocardial infarction)    S/P CABG (coronary artery bypass graft)    DM (diabetes mellitus), type 2    Hypertension    Morbid obesity    CAD (coronary artery disease)    Diverticulosis    Venous insufficiency    Hyperlipidemia    Chronic lower back pain    H/O right nephrectomy     GABBY on CPAP       * NSTEMI (non-ST elevated myocardial infarction)  Stress test results pending  ASA, statin  HbA1C pending  2D echo EF 60%  Cardio on board  cw home meds  CPAP qhs        VTE Risk Mitigation (From admission, onward)         Ordered     enoxaparin injection 40 mg  Every 12 hours      12/06/19 1046     IP VTE HIGH RISK PATIENT  Once      12/06/19 1027                      Osvaldo Boyer MD  Department of Hospital Medicine   [facility]  Date of service: 12/07/2019 11:39 AM

## 2019-12-07 NOTE — CARE UPDATE
12/07/19 1119   Patient Assessment/Suction   Level of Consciousness (AVPU) alert   Respiratory Effort Normal;Unlabored   All Lung Fields Breath Sounds diminished   PRE-TX-O2   O2 Device (Oxygen Therapy) room air   SpO2 96 %   Pulse Oximetry Type Intermittent   $ Pulse Oximetry - Multiple Charge Pulse Oximetry - Multiple   Pulse (!) 111   Aerosol Therapy   $ Aerosol Therapy Charges PRN treatment not required

## 2019-12-07 NOTE — PLAN OF CARE
12/06/19 1949   PRE-TX-O2   O2 Device (Oxygen Therapy) room air   SpO2 99 %   Pulse Oximetry Type Intermittent   $ Pulse Oximetry - Multiple Charge Pulse Oximetry - Multiple   Aerosol Therapy   $ Aerosol Therapy Charges PRN treatment not required   Respiratory Treatment Status (SVN) PRN treatment not required   Wound Care   $ Wound Care Tech Time 15 min   Area of Concern Bridge of nose   Skin Color/Characteristics without discoloration   Skin Temperature warm   Preset CPAP/BiPAP Settings   Mode Of Delivery   (home cpap at bedside)

## 2019-12-07 NOTE — PROGRESS NOTES
[facility]  Cardiology  Progress Note    Patient Name: Pelon Lazo  MRN: [id number]  Admission Date: 12/6/2019  Hospital Length of Stay: 1 days  Code Status: Full Code   Attending Physician: Osvaldo Boyer MD   Primary Care Physician: Heather Lauren NP  Expected Discharge Date:   Principal Problem:NSTEMI (non-ST elevated myocardial infarction)    Subjective:     Hospital Course: no further chest pain  Interval History:Stress test resulted  ROS  Objective:     Vital Signs (Most Recent):  Temp: 98.2 °F (36.8 °C) (12/07/19 1147)  Pulse: 71 (12/07/19 1147)  Resp: 18 (12/07/19 1147)  BP: (!) 110/54 (12/07/19 1147)  SpO2: 96 % (12/07/19 1147) Vital Signs (24h Range):  Temp:  [97.6 °F (36.4 °C)-98.8 °F (37.1 °C)] 98.2 °F (36.8 °C)  Pulse:  [] 71  Resp:  [18] 18  SpO2:  [92 %-99 %] 96 %  BP: ()/(50-56) 110/54     Weight: (!) 144.4 kg (318 lb 5.5 oz)  Body mass index is 44.4 kg/m².    SpO2: 96 %  O2 Device (Oxygen Therapy): room air    No intake or output data in the 24 hours ending 12/07/19 1527    Lines/Drains/Airways     Peripheral Intravenous Line                 Peripheral IV - Single Lumen 12/06/19 0653 20 G Left Antecubital 1 day                Physical Exam morbidly obese.  No respiratory distress pulses irregular heart sounds are normal.  Nael test on both wrists is within normal limits.    Significant Labs:   CMP   Recent Labs   Lab 12/06/19  0656 12/07/19  0431   * 136   K 4.4 4.1    104   CO2 24 23   * 184*   BUN 16 15   CREATININE 0.9 0.8   CALCIUM 9.2 8.6*   PROT 7.1  --    ALBUMIN 4.1  --    BILITOT 0.9  --    ALKPHOS 44*  --    AST 18  --    ALT 22  --    ANIONGAP 9 9   ESTGFRAFRICA >60.0 >60.0   EGFRNONAA >60.0 >60.0   , CBC   Recent Labs   Lab 12/06/19  0656 12/07/19  0431   WBC 9.95 9.78   HGB 11.6* 10.3*   HCT 34.0* 30.6*    231    and INR   Recent Labs   Lab 12/06/19  0656   INR 1.0       Significant Imaging:   Reading Physician Reading Date  Result Priority   Quinton Gonzalez MD 12/7/2019 Routine      Narrative     EXAMINATION:  NM MYOCARDIAL PERFUSION SPECT MULTI PHARM    CLINICAL HISTORY:  Myocardial infarction;  history of myocardial infarction in 2001.  History of coronary artery bypass.    TECHNIQUE:  SPECT images were acquired after the injection of 25.0 mCi of Tc-99m Myoview at rest and 25.0 mCi during a cardiac stress after injection of 0.4 mg Lexiscan.  The clinical stress and ECG portion of the study is to be read separately.    COMPARISON:  None    FINDINGS:  Stress SPECT images demonstrate moderate sized perfusion defect of the inferior myocardial wall.  This defect is largely reversible on rest imaging.  Polar maps confirm this finding.  Otherwise, there is matched homogenous distribution of the tracer throughout the left ventricle on stress and rest imaging.    The gated post-stress images reveal normal wall motion with an estimated LVEF of 74 %.      Impression       1. Moderate size reversible perfusion defect of the inferior left ventricular wall suggesting ischemia.  2. Normal left ventricular wall motion.  3. Estimated ejection fraction of 74 %.       Assessment and Plan:  NSTEMI with the evidence of reversible ischemia moderate area of inferior wall.  Plan of at long discussion with patient he would like to proceed with angiography.  Procedure complications possible a outcomes well explained in details.  Procedure is scheduled for a.m. Tomorrow.  He requested procedure be done via the radial.  I told him it may be difficult as we may have to stick both radials to find the LIMA graft.  It will be very difficult for him to bring his left arm over his of the to be able to reach it.  At this time the plan is to do a right femoral arterial access.       Brief HPI:     Active Diagnoses:    Diagnosis Date Noted POA    PRINCIPAL PROBLEM:  NSTEMI (non-ST elevated myocardial infarction) [I21.4] 12/06/2019 Yes    S/P CABG (coronary artery  bypass graft) [Z95.1] 12/06/2019 Not Applicable     Chronic    DM (diabetes mellitus), type 2 [E11.9] 12/06/2019 Yes     Chronic    Hypertension [I10] 12/06/2019 Yes     Chronic    Morbid obesity [E66.01] 12/06/2019 Yes     Chronic    CAD (coronary artery disease) [I25.10] 12/06/2019 Yes     Chronic    Diverticulosis [K57.90] 12/06/2019 Yes     Chronic    Venous insufficiency [I87.2] 12/06/2019 Yes     Chronic    Hyperlipidemia [E78.5] 12/06/2019 Yes     Chronic    Chronic lower back pain [M54.5, G89.29] 12/06/2019 Yes     Chronic    H/O right nephrectomy [Z90.5] 12/06/2019 Not Applicable     Chronic    GABBY on CPAP [G47.33, Z99.89] 12/06/2019 Not Applicable     Chronic      Problems Resolved During this Admission:       VTE Risk Mitigation (From admission, onward)         Ordered     enoxaparin injection 40 mg  Every 12 hours      12/06/19 1046     IP VTE HIGH RISK PATIENT  Once      12/06/19 1027                Kaiden Camargo MD  Cardiology  [facility]

## 2019-12-08 PROBLEM — R07.9 CHEST PAIN: Status: ACTIVE | Noted: 2019-12-08

## 2019-12-08 LAB
ANION GAP SERPL CALC-SCNC: 7 MMOL/L (ref 8–16)
BASOPHILS # BLD AUTO: 0.06 K/UL (ref 0–0.2)
BASOPHILS NFR BLD: 0.7 % (ref 0–1.9)
BUN SERPL-MCNC: 19 MG/DL (ref 8–23)
CALCIUM SERPL-MCNC: 8.8 MG/DL (ref 8.7–10.5)
CHLORIDE SERPL-SCNC: 102 MMOL/L (ref 95–110)
CO2 SERPL-SCNC: 25 MMOL/L (ref 23–29)
CREAT SERPL-MCNC: 0.9 MG/DL (ref 0.5–1.4)
CV STRESS BASE HR: 67 BPM
DIASTOLIC BLOOD PRESSURE: 50 MMHG
DIFFERENTIAL METHOD: ABNORMAL
EOSINOPHIL # BLD AUTO: 0.2 K/UL (ref 0–0.5)
EOSINOPHIL NFR BLD: 2.7 % (ref 0–8)
ERYTHROCYTE [DISTWIDTH] IN BLOOD BY AUTOMATED COUNT: 13.1 % (ref 11.5–14.5)
EST. GFR  (AFRICAN AMERICAN): >60 ML/MIN/1.73 M^2
EST. GFR  (NON AFRICAN AMERICAN): >60 ML/MIN/1.73 M^2
GLUCOSE SERPL-MCNC: 217 MG/DL (ref 70–110)
GLUCOSE SERPL-MCNC: 225 MG/DL (ref 70–110)
HCT VFR BLD AUTO: 29.8 % (ref 40–54)
HGB BLD-MCNC: 10.1 G/DL (ref 14–18)
IMM GRANULOCYTES # BLD AUTO: 0.05 K/UL (ref 0–0.04)
IMM GRANULOCYTES NFR BLD AUTO: 0.6 % (ref 0–0.5)
LYMPHOCYTES # BLD AUTO: 2.7 K/UL (ref 1–4.8)
LYMPHOCYTES NFR BLD: 31 % (ref 18–48)
MAGNESIUM SERPL-MCNC: 1.8 MG/DL (ref 1.6–2.6)
MCH RBC QN AUTO: 31.8 PG (ref 27–31)
MCHC RBC AUTO-ENTMCNC: 33.9 G/DL (ref 32–36)
MCV RBC AUTO: 94 FL (ref 82–98)
MONOCYTES # BLD AUTO: 0.8 K/UL (ref 0.3–1)
MONOCYTES NFR BLD: 8.6 % (ref 4–15)
NEUTROPHILS # BLD AUTO: 5 K/UL (ref 1.8–7.7)
NEUTROPHILS NFR BLD: 56.4 % (ref 38–73)
NRBC BLD-RTO: 0 /100 WBC
OHS CV CPX 85 PERCENT MAX PREDICTED HEART RATE MALE: 130
OHS CV CPX MAX PREDICTED HEART RATE: 153
OHS CV CPX PATIENT IS FEMALE: 0
OHS CV CPX PATIENT IS MALE: 1
OHS CV CPX PEAK DIASTOLIC BLOOD PRESSURE: 56 MMHG
OHS CV CPX PEAK HEAR RATE: 86 BPM
OHS CV CPX PEAK RATE PRESSURE PRODUCT: 9374
OHS CV CPX PEAK SYSTOLIC BLOOD PRESSURE: 109 MMHG
OHS CV CPX PERCENT MAX PREDICTED HEART RATE ACHIEVED: 56
OHS CV CPX RATE PRESSURE PRODUCT PRESENTING: 6968
PHOSPHATE SERPL-MCNC: 4.2 MG/DL (ref 2.7–4.5)
PLATELET # BLD AUTO: 246 K/UL (ref 150–350)
PMV BLD AUTO: 9.9 FL (ref 9.2–12.9)
POC ACTIVATED CLOTTING TIME K: 202 SEC (ref 74–137)
POTASSIUM SERPL-SCNC: 4.2 MMOL/L (ref 3.5–5.1)
RBC # BLD AUTO: 3.18 M/UL (ref 4.6–6.2)
SAMPLE: ABNORMAL
SODIUM SERPL-SCNC: 134 MMOL/L (ref 136–145)
STRESS ECHO TARGET HR: 130 BPM
STRESS ST DEPRESSION: 1 MM
SYSTOLIC BLOOD PRESSURE: 104 MMHG
WBC # BLD AUTO: 8.77 K/UL (ref 3.9–12.7)

## 2019-12-08 PROCEDURE — C9399 UNCLASSIFIED DRUGS OR BIOLOG: HCPCS | Performed by: FAMILY MEDICINE

## 2019-12-08 PROCEDURE — 94761 N-INVAS EAR/PLS OXIMETRY MLT: CPT

## 2019-12-08 PROCEDURE — C1760 CLOSURE DEV, VASC: HCPCS | Performed by: INTERNAL MEDICINE

## 2019-12-08 PROCEDURE — 80048 BASIC METABOLIC PNL TOTAL CA: CPT

## 2019-12-08 PROCEDURE — 36415 COLL VENOUS BLD VENIPUNCTURE: CPT

## 2019-12-08 PROCEDURE — 99152 MOD SED SAME PHYS/QHP 5/>YRS: CPT | Performed by: INTERNAL MEDICINE

## 2019-12-08 PROCEDURE — C1887 CATHETER, GUIDING: HCPCS | Performed by: INTERNAL MEDICINE

## 2019-12-08 PROCEDURE — 83735 ASSAY OF MAGNESIUM: CPT

## 2019-12-08 PROCEDURE — 25000003 PHARM REV CODE 250: Performed by: INTERNAL MEDICINE

## 2019-12-08 PROCEDURE — 27800903 OPTIME MED/SURG SUP & DEVICES OTHER IMPLANTS: Performed by: INTERNAL MEDICINE

## 2019-12-08 PROCEDURE — C1894 INTRO/SHEATH, NON-LASER: HCPCS | Performed by: INTERNAL MEDICINE

## 2019-12-08 PROCEDURE — C1725 CATH, TRANSLUMIN NON-LASER: HCPCS | Performed by: INTERNAL MEDICINE

## 2019-12-08 PROCEDURE — C1769 GUIDE WIRE: HCPCS | Performed by: INTERNAL MEDICINE

## 2019-12-08 PROCEDURE — 84100 ASSAY OF PHOSPHORUS: CPT

## 2019-12-08 PROCEDURE — 63600175 PHARM REV CODE 636 W HCPCS: Performed by: INTERNAL MEDICINE

## 2019-12-08 PROCEDURE — C1874 STENT, COATED/COV W/DEL SYS: HCPCS | Performed by: INTERNAL MEDICINE

## 2019-12-08 PROCEDURE — 99153 MOD SED SAME PHYS/QHP EA: CPT | Performed by: INTERNAL MEDICINE

## 2019-12-08 PROCEDURE — 25000003 PHARM REV CODE 250: Performed by: FAMILY MEDICINE

## 2019-12-08 PROCEDURE — 27000221 HC OXYGEN, UP TO 24 HOURS

## 2019-12-08 PROCEDURE — C9606 PERC D-E COR REVASC W AMI S: HCPCS | Mod: RC

## 2019-12-08 PROCEDURE — 63600175 PHARM REV CODE 636 W HCPCS: Performed by: EMERGENCY MEDICINE

## 2019-12-08 PROCEDURE — 99900035 HC TECH TIME PER 15 MIN (STAT)

## 2019-12-08 PROCEDURE — 25500020 PHARM REV CODE 255: Performed by: INTERNAL MEDICINE

## 2019-12-08 PROCEDURE — 85025 COMPLETE CBC W/AUTO DIFF WBC: CPT

## 2019-12-08 PROCEDURE — 93459 L HRT ART/GRFT ANGIO: CPT | Mod: XU

## 2019-12-08 PROCEDURE — 21000000 HC CCU ICU ROOM CHARGE

## 2019-12-08 PROCEDURE — 63600175 PHARM REV CODE 636 W HCPCS: Performed by: FAMILY MEDICINE

## 2019-12-08 DEVICE — STENT RONYX30026UX RESOLUTE ONYX 3.00X26
Type: IMPLANTABLE DEVICE | Site: CORONARY | Status: FUNCTIONAL
Brand: RESOLUTE ONYX™

## 2019-12-08 DEVICE — ANGIO-SEAL VIP VASCULAR CLOSURE DEVICE
Type: IMPLANTABLE DEVICE | Site: CORONARY | Status: FUNCTIONAL
Brand: ANGIO-SEAL

## 2019-12-08 RX ORDER — HYDROCODONE BITARTRATE AND ACETAMINOPHEN 5; 325 MG/1; MG/1
1 TABLET ORAL EVERY 4 HOURS PRN
Status: DISCONTINUED | OUTPATIENT
Start: 2019-12-08 | End: 2019-12-10 | Stop reason: HOSPADM

## 2019-12-08 RX ORDER — LIDOCAINE HYDROCHLORIDE 10 MG/ML
INJECTION INFILTRATION; PERINEURAL
Status: DISCONTINUED | OUTPATIENT
Start: 2019-12-08 | End: 2019-12-10 | Stop reason: HOSPADM

## 2019-12-08 RX ORDER — HEPARIN SODIUM 1000 [USP'U]/ML
INJECTION, SOLUTION INTRAVENOUS; SUBCUTANEOUS
Status: DISCONTINUED | OUTPATIENT
Start: 2019-12-08 | End: 2019-12-10 | Stop reason: HOSPADM

## 2019-12-08 RX ORDER — MIDAZOLAM HYDROCHLORIDE 1 MG/ML
INJECTION INTRAMUSCULAR; INTRAVENOUS
Status: DISCONTINUED | OUTPATIENT
Start: 2019-12-08 | End: 2019-12-10 | Stop reason: HOSPADM

## 2019-12-08 RX ORDER — SODIUM CHLORIDE 450 MG/100ML
75 INJECTION, SOLUTION INTRAVENOUS CONTINUOUS
Status: ACTIVE | OUTPATIENT
Start: 2019-12-08 | End: 2019-12-08

## 2019-12-08 RX ORDER — CLOPIDOGREL BISULFATE 75 MG/1
75 TABLET ORAL DAILY
Status: DISCONTINUED | OUTPATIENT
Start: 2019-12-09 | End: 2019-12-10 | Stop reason: HOSPADM

## 2019-12-08 RX ORDER — CLOPIDOGREL BISULFATE 75 MG/1
600 TABLET ORAL ONCE
Status: DISCONTINUED | OUTPATIENT
Start: 2019-12-08 | End: 2019-12-10 | Stop reason: HOSPADM

## 2019-12-08 RX ORDER — HYDROCODONE BITARTRATE AND ACETAMINOPHEN 10; 325 MG/1; MG/1
1 TABLET ORAL EVERY 6 HOURS PRN
Status: DISCONTINUED | OUTPATIENT
Start: 2019-12-08 | End: 2019-12-10 | Stop reason: HOSPADM

## 2019-12-08 RX ORDER — FENTANYL CITRATE 50 UG/ML
INJECTION, SOLUTION INTRAMUSCULAR; INTRAVENOUS
Status: DISCONTINUED | OUTPATIENT
Start: 2019-12-08 | End: 2019-12-10 | Stop reason: HOSPADM

## 2019-12-08 RX ORDER — NITROGLYCERIN 0.4 MG/1
0.4 TABLET SUBLINGUAL EVERY 5 MIN PRN
Status: DISCONTINUED | OUTPATIENT
Start: 2019-12-08 | End: 2019-12-10 | Stop reason: HOSPADM

## 2019-12-08 RX ORDER — ACETAMINOPHEN 500 MG
1000 TABLET ORAL EVERY 6 HOURS PRN
Status: DISCONTINUED | OUTPATIENT
Start: 2019-12-08 | End: 2019-12-10 | Stop reason: HOSPADM

## 2019-12-08 RX ORDER — HYDROMORPHONE HYDROCHLORIDE 1 MG/ML
INJECTION, SOLUTION INTRAMUSCULAR; INTRAVENOUS; SUBCUTANEOUS
Status: DISCONTINUED | OUTPATIENT
Start: 2019-12-08 | End: 2019-12-10 | Stop reason: HOSPADM

## 2019-12-08 RX ORDER — CLOPIDOGREL BISULFATE 75 MG/1
TABLET ORAL
Status: DISCONTINUED | OUTPATIENT
Start: 2019-12-08 | End: 2019-12-10 | Stop reason: HOSPADM

## 2019-12-08 RX ADMIN — PANTOPRAZOLE SODIUM 40 MG: 40 TABLET, DELAYED RELEASE ORAL at 06:12

## 2019-12-08 RX ADMIN — HYDROCODONE BITARTRATE AND ACETAMINOPHEN 1 TABLET: 10; 325 TABLET ORAL at 11:12

## 2019-12-08 RX ADMIN — INSULIN ASPART 2 UNITS: 100 INJECTION, SOLUTION INTRAVENOUS; SUBCUTANEOUS at 09:12

## 2019-12-08 RX ADMIN — OXYCODONE AND ACETAMINOPHEN 1 TABLET: 5; 325 TABLET ORAL at 06:12

## 2019-12-08 RX ADMIN — INSULIN DETEMIR 15 UNITS: 100 INJECTION, SOLUTION SUBCUTANEOUS at 09:12

## 2019-12-08 RX ADMIN — ONDANSETRON 4 MG: 2 INJECTION INTRAMUSCULAR; INTRAVENOUS at 09:12

## 2019-12-08 RX ADMIN — LORAZEPAM 1 MG: 1 TABLET ORAL at 07:12

## 2019-12-08 RX ADMIN — SODIUM CHLORIDE 75 ML/HR: 0.45 INJECTION, SOLUTION INTRAVENOUS at 10:12

## 2019-12-08 RX ADMIN — ASPIRIN 325 MG ORAL TABLET 325 MG: 325 PILL ORAL at 11:12

## 2019-12-08 RX ADMIN — MAGNESIUM OXIDE 800 MG: 400 TABLET ORAL at 06:12

## 2019-12-08 RX ADMIN — ENOXAPARIN SODIUM 40 MG: 100 INJECTION SUBCUTANEOUS at 08:12

## 2019-12-08 RX ADMIN — PRAVASTATIN SODIUM 20 MG: 20 TABLET ORAL at 08:12

## 2019-12-08 NOTE — SUBJECTIVE & OBJECTIVE
Interval History:  Stress test was positive yesterday.  Patient scheduled for angiogram today.  Patient reports feeling well.  Denies any chest pain, shortness of breath.    Review of Systems   Constitutional: Negative for chills, fatigue, fever and unexpected weight change.   HENT: Negative for sore throat.    Eyes: Negative for visual disturbance.   Respiratory: Negative for cough, chest tightness and shortness of breath.    Cardiovascular: Negative for chest pain.   Gastrointestinal: Negative for abdominal pain, constipation, diarrhea, nausea and vomiting.   Endocrine: Negative for polyuria.   Genitourinary: Negative for dysuria and hematuria.   Neurological: Negative for headaches.     Objective:     Vital Signs (Most Recent):  Temp: 97.8 °F (36.6 °C) (12/08/19 0705)  Pulse: 70 (12/08/19 0705)  Resp: 18 (12/08/19 0705)  BP: (!) 101/55 (12/08/19 0705)  SpO2: (!) 94 % (12/08/19 0705) Vital Signs (24h Range):  Temp:  [97.8 °F (36.6 °C)-98.9 °F (37.2 °C)] 97.8 °F (36.6 °C)  Pulse:  [] 70  Resp:  [8-18] 18  SpO2:  [91 %-96 %] 94 %  BP: (101-126)/(49-60) 101/55     Weight: (!) 144.4 kg (318 lb 5.5 oz)  Body mass index is 44.4 kg/m².    Intake/Output Summary (Last 24 hours) at 12/8/2019 0903  Last data filed at 12/7/2019 1400  Gross per 24 hour   Intake 360 ml   Output --   Net 360 ml      Physical Exam  Constitutional: He is oriented to person, place, and time. He appears well-developed and well-nourished. No distress.   Morbid obesity   HENT:   Head: Normocephalic and atraumatic.   Right Ear: External ear normal.   Left Ear: External ear normal.   Eyes: Conjunctivae and EOM are normal. Right eye exhibits no discharge. Left eye exhibits no discharge. No scleral icterus.   Neck: Normal range of motion.   Cardiovascular: Normal rate, regular rhythm and normal heart sounds. Exam reveals no gallop and no friction rub.   No murmur heard.  Pulmonary/Chest: Effort normal and breath sounds normal. No respiratory  distress. He has no wheezes. He has no rales. He exhibits no tenderness.   Musculoskeletal: He exhibits edema (+1 R LE, +2 L LE). He exhibits no tenderness.   Neurological: He is alert and oriented to person, place, and time.   Skin: Skin is warm. He is not diaphoretic.   Psychiatric: He has a normal mood and affect. His behavior is normal. Judgment and thought content normal.   Nursing note and vitals reviewed.    Significant Labs:   CBC:   Recent Labs   Lab 12/07/19 0431 12/08/19 0446   WBC 9.78 8.77   HGB 10.3* 10.1*   HCT 30.6* 29.8*    246     CMP:   Recent Labs   Lab 12/07/19  0431 12/07/19  1538 12/08/19 0446    135* 134*   K 4.1 4.4 4.2    103 102   CO2 23 24 25   * 167* 225*   BUN 15 16 19   CREATININE 0.8 0.9 0.9   CALCIUM 8.6* 9.4 8.8   ANIONGAP 9 8 7*   EGFRNONAA >60.0 >60.0 >60.0     Lipid Panel:   Recent Labs   Lab 12/07/19 0431   CHOL 135   HDL 22*   LDLCALC 72.0   TRIG 205*   CHOLHDL 16.3*       Significant Imaging:   Nuclear khushboo scan  Impression:       1. Moderate size reversible perfusion defect of the inferior left ventricular wall suggesting ischemia.  2. Normal left ventricular wall motion.  3. Estimated ejection fraction of 74 %.

## 2019-12-08 NOTE — HOSPITAL COURSE
Patient was admitted for an NSTEMI.  Patient had a stress test that found moderate reversible ischemia of the left ventricular wall.  Patient had an angiogram on 12/08/2019 and had a SIDNEY placed on mid RCA.  Patient tolerated procedure well.  Denies any chest pain or shortness of breath after procedure.  Patient had a large painless bloody bowel movement on 12/09/2019.  H&H remained stable.  Patient has a history of diverticulosis and reports that he has not had a painless bloody bowel movement in several years.  Today upon my assessment patient denied any symptoms.  He walked around nursing station without any dyspnea, chest pain or hypotension.  He was cleared for discharge by Cardiology.  Due to his borderline low blood pressure lisinopril was discontinued and low-dose metoprolol was started.  He was discharged on aspirin and Plavix.  I counseled him extensively on importance of dual antiplatelets and consequences of stopping it without doctor's consultation.  He voiced good understanding.  He was advised to follow up with Cardiology within a week.     Review of Systems   Constitutional: Negative for chills, fatigue, fever and unexpected weight change.   HENT: Negative for sore throat.    Eyes: Negative for visual disturbance.   Respiratory: Negative for cough, chest tightness and shortness of breath.    Cardiovascular: Negative for chest pain.   Gastrointestinal: Negative for abdominal pain, constipation, diarrhea, nausea and vomiting.   Endocrine: Negative for polyuria.   Genitourinary: Negative for dysuria and hematuria.   Neurological: Negative for headaches    Physical Exam  Constitutional: He is oriented to person, place, and time. He appears well-developed and well-nourished. No distress.   Morbid obesity   HENT:   Head: Normocephalic and atraumatic.   Right Ear: External ear normal.   Left Ear: External ear normal.   Eyes: Conjunctivae and EOM are normal. Right eye exhibits no discharge. Left eye  exhibits no discharge. No scleral icterus.   Neck: Normal range of motion.   Cardiovascular: Normal rate, regular rhythm and normal heart sounds. Exam reveals no gallop and no friction rub.   No murmur heard.  Pulmonary/Chest: Effort normal and breath sounds normal. No respiratory distress. He has no wheezes. He has no rales. He exhibits no tenderness.   Musculoskeletal: He exhibits edema (+1 R LE, +1 L LE). He exhibits no tenderness.   Neurological: He is alert and oriented to person, place, and time.   Skin: Skin is warm. He is not diaphoretic.   Psychiatric: He has a normal mood and affect. His behavior is normal. Judgment and thought content normal.     Nursing note and vitals reviewed

## 2019-12-08 NOTE — ASSESSMENT & PLAN NOTE
Stress test results positive  ASA, statin  HbA1C pending  LDL at goal  2D echo EF 60%  Cardio on board  cw home meds  CPAP qhs  Plan for angiogram today

## 2019-12-08 NOTE — PROGRESS NOTES
[facility] Medicine  Progress Note    Patient Name: Pelon Lazo  MRN: [id number]  Patient Class: IP- Inpatient   Admission Date: 12/6/2019  Length of Stay: 2 days  Attending Physician: Osvaldo Boyer MD  Primary Care Provider: Heather Lauren NP        Subjective:     Principal Problem:NSTEMI (non-ST elevated myocardial infarction)    Interval History:  Stress test was positive yesterday.  Patient scheduled for angiogram today.  Patient reports feeling well.  Denies any chest pain, shortness of breath.    Review of Systems   Constitutional: Negative for chills, fatigue, fever and unexpected weight change.   HENT: Negative for sore throat.    Eyes: Negative for visual disturbance.   Respiratory: Negative for cough, chest tightness and shortness of breath.    Cardiovascular: Negative for chest pain.   Gastrointestinal: Negative for abdominal pain, constipation, diarrhea, nausea and vomiting.   Endocrine: Negative for polyuria.   Genitourinary: Negative for dysuria and hematuria.   Neurological: Negative for headaches.     Objective:     Vital Signs (Most Recent):  Temp: 97.8 °F (36.6 °C) (12/08/19 0705)  Pulse: 70 (12/08/19 0705)  Resp: 18 (12/08/19 0705)  BP: (!) 101/55 (12/08/19 0705)  SpO2: (!) 94 % (12/08/19 0705) Vital Signs (24h Range):  Temp:  [97.8 °F (36.6 °C)-98.9 °F (37.2 °C)] 97.8 °F (36.6 °C)  Pulse:  [] 70  Resp:  [8-18] 18  SpO2:  [91 %-96 %] 94 %  BP: (101-126)/(49-60) 101/55     Weight: (!) 144.4 kg (318 lb 5.5 oz)  Body mass index is 44.4 kg/m².    Intake/Output Summary (Last 24 hours) at 12/8/2019 0903  Last data filed at 12/7/2019 1400  Gross per 24 hour   Intake 360 ml   Output --   Net 360 ml      Physical Exam  Constitutional: He is oriented to person, place, and time. He appears well-developed and well-nourished. No distress.   Morbid obesity   HENT:   Head: Normocephalic and atraumatic.   Right Ear: External ear normal.   Left Ear: External ear normal.    Eyes: Conjunctivae and EOM are normal. Right eye exhibits no discharge. Left eye exhibits no discharge. No scleral icterus.   Neck: Normal range of motion.   Cardiovascular: Normal rate, regular rhythm and normal heart sounds. Exam reveals no gallop and no friction rub.   No murmur heard.  Pulmonary/Chest: Effort normal and breath sounds normal. No respiratory distress. He has no wheezes. He has no rales. He exhibits no tenderness.   Musculoskeletal: He exhibits edema (+1 R LE, +2 L LE). He exhibits no tenderness.   Neurological: He is alert and oriented to person, place, and time.   Skin: Skin is warm. He is not diaphoretic.   Psychiatric: He has a normal mood and affect. His behavior is normal. Judgment and thought content normal.   Nursing note and vitals reviewed.    Significant Labs:   CBC:   Recent Labs   Lab 12/07/19  0431 12/08/19  0446   WBC 9.78 8.77   HGB 10.3* 10.1*   HCT 30.6* 29.8*    246     CMP:   Recent Labs   Lab 12/07/19  0431 12/07/19  1538 12/08/19  0446    135* 134*   K 4.1 4.4 4.2    103 102   CO2 23 24 25   * 167* 225*   BUN 15 16 19   CREATININE 0.8 0.9 0.9   CALCIUM 8.6* 9.4 8.8   ANIONGAP 9 8 7*   EGFRNONAA >60.0 >60.0 >60.0     Lipid Panel:   Recent Labs   Lab 12/07/19  0431   CHOL 135   HDL 22*   LDLCALC 72.0   TRIG 205*   CHOLHDL 16.3*       Significant Imaging:   Nuclear khushboo scan  Impression:       1. Moderate size reversible perfusion defect of the inferior left ventricular wall suggesting ischemia.  2. Normal left ventricular wall motion.  3. Estimated ejection fraction of 74 %.           Assessment/Plan:      Active Hospital Problems    Diagnosis    *NSTEMI (non-ST elevated myocardial infarction)    S/P CABG (coronary artery bypass graft)    DM (diabetes mellitus), type 2    Hypertension    Morbid obesity    CAD (coronary artery disease)    Diverticulosis    Venous insufficiency    Hyperlipidemia    Chronic lower back pain    H/O right  nephrectomy    GABBY on CPAP       * NSTEMI (non-ST elevated myocardial infarction)  Stress test results positive  ASA, statin  HbA1C pending  LDL at goal  2D echo EF 60%  Cardio on board  cw home meds  CPAP qhs  Plan for angiogram today        VTE Risk Mitigation (From admission, onward)         Ordered     heparin (porcine) injection  As needed (PRN)      12/08/19 0904     heparin (porcine) 2,000 Units in sodium chloride 0.9% 500 mL  As needed (PRN)      12/08/19 0853     enoxaparin injection 40 mg  Every 12 hours      12/06/19 1046     IP VTE HIGH RISK PATIENT  Once      12/06/19 1027                      Osvaldo Boyer MD  Department of Hospital Medicine   [facility]  Date of service: 12/08/2019 9:08 AM

## 2019-12-08 NOTE — UM SECONDARY REVIEW
.EHR RECOMMENDATION FOR 12/7/2019 --INPATIENT    PER DR ISABELLE GALVAN DO.     Anais Ferguson, RN

## 2019-12-08 NOTE — UM SECONDARY REVIEW
12/6/2019  EHR RECOMMENDATION OUTPATIENT PER BOB MYLES MD.    HOWEVER PATIENT DX: WITH NSTEMI PER CARDIOLOGY AND NOW MEETS INPATIENT. CONTINUED STAY RESENT TO EHR FOR RECOMMENDATION. Anais Ferguson RN

## 2019-12-08 NOTE — PLAN OF CARE
This note also relates to the following rows which could not be included:  SpO2 - Cannot attach notes to unvalidated device data  Pulse - Cannot attach notes to unvalidated device data  Resp - Cannot attach notes to unvalidated device data       12/08/19 1129   Patient Assessment/Suction   Level of Consciousness (AVPU) alert   Respiratory Effort Normal;Unlabored   Expansion/Accessory Muscles/Retractions no use of accessory muscles   All Lung Fields Breath Sounds clear   PRE-TX-O2   O2 Device (Oxygen Therapy) CPAP  (HOME CPAP)   $ Is the patient on Low Flow Oxygen? Yes   Flow (L/min) 1   Pulse Oximetry Type Continuous   $ Pulse Oximetry - Multiple Charge Pulse Oximetry - Multiple   Aerosol Therapy   $ Aerosol Therapy Charges PRN treatment not required   Daily Review of Necessity (SVN) completed   Respiratory Treatment Status (SVN) PRN treatment not required

## 2019-12-08 NOTE — PLAN OF CARE
12/07/19 2117   Patient Assessment/Suction   Level of Consciousness (AVPU) alert   Respiratory Effort Normal;Unlabored   Expansion/Accessory Muscles/Retractions no use of accessory muscles   All Lung Fields Breath Sounds clear   PRE-TX-O2   O2 Device (Oxygen Therapy) room air   SpO2 96 %   Pulse Oximetry Type Intermittent   $ Pulse Oximetry - Multiple Charge Pulse Oximetry - Multiple   Pulse 78   Resp (!) 8   Aerosol Therapy   $ Aerosol Therapy Charges PRN treatment not required   Daily Review of Necessity (SVN) completed   Respiratory Treatment Status (SVN) PRN treatment not required   Respiratory Evaluation   $ Care Plan Tech Time 15 min   pt. Removed from RT services/per pt. Request/no sob wheezing/no use of o2 since admit

## 2019-12-09 LAB
ANION GAP SERPL CALC-SCNC: 8 MMOL/L (ref 8–16)
BUN SERPL-MCNC: 15 MG/DL (ref 8–23)
CALCIUM SERPL-MCNC: 8.7 MG/DL (ref 8.7–10.5)
CHLORIDE SERPL-SCNC: 102 MMOL/L (ref 95–110)
CO2 SERPL-SCNC: 26 MMOL/L (ref 23–29)
CREAT SERPL-MCNC: 0.7 MG/DL (ref 0.5–1.4)
ERYTHROCYTE [DISTWIDTH] IN BLOOD BY AUTOMATED COUNT: 13.2 % (ref 11.5–14.5)
EST. GFR  (AFRICAN AMERICAN): >60 ML/MIN/1.73 M^2
EST. GFR  (NON AFRICAN AMERICAN): >60 ML/MIN/1.73 M^2
GLUCOSE SERPL-MCNC: 183 MG/DL (ref 70–110)
GLUCOSE SERPL-MCNC: 185 MG/DL (ref 70–110)
GLUCOSE SERPL-MCNC: 267 MG/DL (ref 70–110)
HCT VFR BLD AUTO: 28.8 % (ref 40–54)
HCT VFR BLD AUTO: 29 % (ref 40–54)
HCT VFR BLD AUTO: 30.1 % (ref 40–54)
HGB BLD-MCNC: 10 G/DL (ref 14–18)
HGB BLD-MCNC: 10 G/DL (ref 14–18)
HGB BLD-MCNC: 10.3 G/DL (ref 14–18)
MAGNESIUM SERPL-MCNC: 1.7 MG/DL (ref 1.6–2.6)
MCH RBC QN AUTO: 32.4 PG (ref 27–31)
MCHC RBC AUTO-ENTMCNC: 34.5 G/DL (ref 32–36)
MCV RBC AUTO: 94 FL (ref 82–98)
PHOSPHATE SERPL-MCNC: 3.1 MG/DL (ref 2.7–4.5)
PLATELET # BLD AUTO: 257 K/UL (ref 150–350)
PMV BLD AUTO: 9.9 FL (ref 9.2–12.9)
POTASSIUM SERPL-SCNC: 4.1 MMOL/L (ref 3.5–5.1)
RBC # BLD AUTO: 3.09 M/UL (ref 4.6–6.2)
SODIUM SERPL-SCNC: 136 MMOL/L (ref 136–145)
TROPONIN I SERPL DL<=0.01 NG/ML-MCNC: 0.93 NG/ML (ref 0.02–0.04)
WBC # BLD AUTO: 11.65 K/UL (ref 3.9–12.7)

## 2019-12-09 PROCEDURE — 82962 GLUCOSE BLOOD TEST: CPT

## 2019-12-09 PROCEDURE — 80048 BASIC METABOLIC PNL TOTAL CA: CPT

## 2019-12-09 PROCEDURE — 84484 ASSAY OF TROPONIN QUANT: CPT

## 2019-12-09 PROCEDURE — 63600175 PHARM REV CODE 636 W HCPCS: Performed by: FAMILY MEDICINE

## 2019-12-09 PROCEDURE — 84100 ASSAY OF PHOSPHORUS: CPT

## 2019-12-09 PROCEDURE — 85027 COMPLETE CBC AUTOMATED: CPT

## 2019-12-09 PROCEDURE — 21000000 HC CCU ICU ROOM CHARGE

## 2019-12-09 PROCEDURE — 36415 COLL VENOUS BLD VENIPUNCTURE: CPT

## 2019-12-09 PROCEDURE — 94761 N-INVAS EAR/PLS OXIMETRY MLT: CPT

## 2019-12-09 PROCEDURE — 93005 ELECTROCARDIOGRAM TRACING: CPT

## 2019-12-09 PROCEDURE — C9399 UNCLASSIFIED DRUGS OR BIOLOG: HCPCS | Performed by: FAMILY MEDICINE

## 2019-12-09 PROCEDURE — 85014 HEMATOCRIT: CPT

## 2019-12-09 PROCEDURE — 25000003 PHARM REV CODE 250: Performed by: INTERNAL MEDICINE

## 2019-12-09 PROCEDURE — 63600175 PHARM REV CODE 636 W HCPCS: Performed by: EMERGENCY MEDICINE

## 2019-12-09 PROCEDURE — 83735 ASSAY OF MAGNESIUM: CPT

## 2019-12-09 PROCEDURE — 85018 HEMOGLOBIN: CPT | Mod: 91

## 2019-12-09 PROCEDURE — 99900035 HC TECH TIME PER 15 MIN (STAT)

## 2019-12-09 PROCEDURE — 25000003 PHARM REV CODE 250: Performed by: FAMILY MEDICINE

## 2019-12-09 RX ADMIN — POLYETHYLENE GLYCOL 3350 17 G: 17 POWDER, FOR SOLUTION ORAL at 08:12

## 2019-12-09 RX ADMIN — INSULIN ASPART 2 UNITS: 100 INJECTION, SOLUTION INTRAVENOUS; SUBCUTANEOUS at 05:12

## 2019-12-09 RX ADMIN — MAGNESIUM OXIDE 800 MG: 400 TABLET ORAL at 08:12

## 2019-12-09 RX ADMIN — INSULIN DETEMIR 15 UNITS: 100 INJECTION, SOLUTION SUBCUTANEOUS at 10:12

## 2019-12-09 RX ADMIN — CLOPIDOGREL BISULFATE 75 MG: 75 TABLET, FILM COATED ORAL at 08:12

## 2019-12-09 RX ADMIN — INSULIN ASPART 3 UNITS: 100 INJECTION, SOLUTION INTRAVENOUS; SUBCUTANEOUS at 10:12

## 2019-12-09 RX ADMIN — ENOXAPARIN SODIUM 40 MG: 100 INJECTION SUBCUTANEOUS at 08:12

## 2019-12-09 RX ADMIN — HYDROCODONE BITARTRATE AND ACETAMINOPHEN 1 TABLET: 10; 325 TABLET ORAL at 10:12

## 2019-12-09 RX ADMIN — PRAVASTATIN SODIUM 20 MG: 20 TABLET ORAL at 10:12

## 2019-12-09 RX ADMIN — ENOXAPARIN SODIUM 40 MG: 100 INJECTION SUBCUTANEOUS at 10:12

## 2019-12-09 RX ADMIN — INSULIN ASPART 2 UNITS: 100 INJECTION, SOLUTION INTRAVENOUS; SUBCUTANEOUS at 08:12

## 2019-12-09 RX ADMIN — LISINOPRIL 10 MG: 10 TABLET ORAL at 08:12

## 2019-12-09 RX ADMIN — ASPIRIN 325 MG ORAL TABLET 325 MG: 325 PILL ORAL at 08:12

## 2019-12-09 NOTE — PROGRESS NOTES
Cardiac Rehab     Pelon Lazo   [id number]   12/9/2019         Cardiac Rehab Phase Taught: Phase 1    Teaching Method: Verbal, Written    Handouts: Cardiac Rehab Tear Sheet and Stent Card    Educational Videos: None    Understanding:  Learning indicated by feedback and Verbalize understanding    Comments: Education on post angiogram care/activities, stent, medication( Plavix) and cardiac rehab. Pt is a  and is unsure if his schedule will allow him to attend cardiac rehab. Will follow. Questions answered.    Total Time Spent: 15 mins            Padmini Estevez RN

## 2019-12-09 NOTE — SUBJECTIVE & OBJECTIVE
Interval History:  Patient reports feeling well.  Patient is sitting up in chair.  Denies any chest pain, shortness of breath.  Patient had a SIDNEY placed on the mid RCA yesterday.  Tolerated procedure well.  Nursing reports that patient had a large not painless bloody bowel movement today.  Patient reports he feels well otherwise has not had a bloody bowel movement in several years.  Patient does have history of diverticulosis.  More monitor H&H closely.     Review of Systems   Constitutional: Negative for chills, fatigue, fever and unexpected weight change.   HENT: Negative for sore throat.    Eyes: Negative for visual disturbance.   Respiratory: Negative for cough, chest tightness and shortness of breath.    Cardiovascular: Negative for chest pain.   Gastrointestinal: Negative for abdominal pain, constipation, diarrhea, nausea and vomiting.   Endocrine: Negative for polyuria.   Genitourinary: Negative for dysuria and hematuria.   Neurological: Negative for headaches.     Objective:     Vital Signs (Most Recent):  Temp: 98.5 °F (36.9 °C) (12/09/19 0805)  Pulse: 108 (12/09/19 0805)  Resp: (!) 24 (12/09/19 0805)  BP: (!) 122/59 (12/09/19 0805)  SpO2: 96 % (12/09/19 0805) Vital Signs (24h Range):  Temp:  [97.7 °F (36.5 °C)-98.5 °F (36.9 °C)] 98.5 °F (36.9 °C)  Pulse:  [] 108  Resp:  [12-24] 24  SpO2:  [94 %-99 %] 96 %  BP: (101-138)/(50-67) 122/59     Weight: (!) 149.7 kg (330 lb 0.5 oz)  Body mass index is 46.03 kg/m².    Intake/Output Summary (Last 24 hours) at 12/9/2019 1155  Last data filed at 12/9/2019 0700  Gross per 24 hour   Intake 640 ml   Output 2925 ml   Net -2285 ml      Physical Exam  Constitutional: He is oriented to person, place, and time. He appears well-developed and well-nourished. No distress.   Morbid obesity   HENT:   Head: Normocephalic and atraumatic.   Right Ear: External ear normal.   Left Ear: External ear normal.   Eyes: Conjunctivae and EOM are normal. Right eye exhibits no discharge.  Left eye exhibits no discharge. No scleral icterus.   Neck: Normal range of motion.   Cardiovascular: Normal rate, regular rhythm and normal heart sounds. Exam reveals no gallop and no friction rub.   No murmur heard.  Pulmonary/Chest: Effort normal and breath sounds normal. No respiratory distress. He has no wheezes. He has no rales. He exhibits no tenderness.   Musculoskeletal: He exhibits edema (+1 R LE, +2 L LE). He exhibits no tenderness.   Neurological: He is alert and oriented to person, place, and time.   Skin: Skin is warm. He is not diaphoretic.   Psychiatric: He has a normal mood and affect. His behavior is normal. Judgment and thought content normal.   Nursing note and vitals reviewed.    Significant Labs:   CBC:   Recent Labs   Lab 12/08/19  0446 12/09/19  0404   WBC 8.77 11.65   HGB 10.1* 10.0*   HCT 29.8* 29.0*    257     CMP:   Recent Labs   Lab 12/07/19  1538 12/08/19  0446 12/09/19  0404   * 134* 136   K 4.4 4.2 4.1    102 102   CO2 24 25 26   * 225* 185*   BUN 16 19 15   CREATININE 0.9 0.9 0.7   CALCIUM 9.4 8.8 8.7   ANIONGAP 8 7* 8   EGFRNONAA >60.0 >60.0 >60.0       Significant Imaging: I have reviewed all pertinent imaging results/findings within the past 24 hours.

## 2019-12-09 NOTE — PROGRESS NOTES
[facility]  Progress Note    Patient Name: Pelon Lazo  MRN: [id number]  Patient Class: IP- Inpatient   Admission Date: 12/6/2019  Length of Stay: 3 days  Attending Physician: Osvaldo Boyer MD  Primary Care Provider: Heather Lauren NP        Subjective:     Principal Problem:NSTEMI (non-ST elevated myocardial infarction)    Overview/Hospital Course:  Patient was admitted for an NSTEMI.  Patient had a stress test that found moderate reversible ischemia of the left ventricular wall.  Patient had an angiogram on 12/08/2019 and had a SIDNEY placed on mid RCA.  Patient tolerated procedure well.  Denies any chest pain or shortness of breath after procedure.  Patient had a large painless bloody bowel movement on 12/09/2019.  H&H remained stable.  Patient has a history of diverticulosis and reports that he has not had a painless bloody bowel movement in several years.    Interval History:  Patient reports feeling well.  Patient is sitting up in chair.  Denies any chest pain, shortness of breath.  Patient had a SIDNEY placed on the mid RCA yesterday.  Tolerated procedure well.  Nursing reports that patient had a large not painless bloody bowel movement today.  Patient reports he feels well otherwise has not had a bloody bowel movement in several years.  Patient does have history of diverticulosis.  More monitor H&H closely.     Review of Systems   Constitutional: Negative for chills, fatigue, fever and unexpected weight change.   HENT: Negative for sore throat.    Eyes: Negative for visual disturbance.   Respiratory: Negative for cough, chest tightness and shortness of breath.    Cardiovascular: Negative for chest pain.   Gastrointestinal: Negative for abdominal pain, constipation, diarrhea, nausea and vomiting.   Endocrine: Negative for polyuria.   Genitourinary: Negative for dysuria and hematuria.   Neurological: Negative for headaches.     Objective:     Vital Signs (Most  Recent):  Temp: 98.5 °F (36.9 °C) (12/09/19 0805)  Pulse: 108 (12/09/19 0805)  Resp: (!) 24 (12/09/19 0805)  BP: (!) 122/59 (12/09/19 0805)  SpO2: 96 % (12/09/19 0805) Vital Signs (24h Range):  Temp:  [97.7 °F (36.5 °C)-98.5 °F (36.9 °C)] 98.5 °F (36.9 °C)  Pulse:  [] 108  Resp:  [12-24] 24  SpO2:  [94 %-99 %] 96 %  BP: (101-138)/(50-67) 122/59     Weight: (!) 149.7 kg (330 lb 0.5 oz)  Body mass index is 46.03 kg/m².    Intake/Output Summary (Last 24 hours) at 12/9/2019 1155  Last data filed at 12/9/2019 0700  Gross per 24 hour   Intake 640 ml   Output 2925 ml   Net -2285 ml      Physical Exam  Constitutional: He is oriented to person, place, and time. He appears well-developed and well-nourished. No distress.   Morbid obesity   HENT:   Head: Normocephalic and atraumatic.   Right Ear: External ear normal.   Left Ear: External ear normal.   Eyes: Conjunctivae and EOM are normal. Right eye exhibits no discharge. Left eye exhibits no discharge. No scleral icterus.   Neck: Normal range of motion.   Cardiovascular: Normal rate, regular rhythm and normal heart sounds. Exam reveals no gallop and no friction rub.   No murmur heard.  Pulmonary/Chest: Effort normal and breath sounds normal. No respiratory distress. He has no wheezes. He has no rales. He exhibits no tenderness.   Musculoskeletal: He exhibits edema (+1 R LE, +2 L LE). He exhibits no tenderness.   Neurological: He is alert and oriented to person, place, and time.   Skin: Skin is warm. He is not diaphoretic.   Psychiatric: He has a normal mood and affect. His behavior is normal. Judgment and thought content normal.   Nursing note and vitals reviewed.    Significant Labs:   CBC:   Recent Labs   Lab 12/08/19  0446 12/09/19  0404   WBC 8.77 11.65   HGB 10.1* 10.0*   HCT 29.8* 29.0*    257     CMP:   Recent Labs   Lab 12/07/19  1538 12/08/19  0446 12/09/19  0404   * 134* 136   K 4.4 4.2 4.1    102 102   CO2 24 25 26   * 225* 185*   BUN  16 19 15   CREATININE 0.9 0.9 0.7   CALCIUM 9.4 8.8 8.7   ANIONGAP 8 7* 8   EGFRNONAA >60.0 >60.0 >60.0       Significant Imaging: I have reviewed all pertinent imaging results/findings within the past 24 hours.      Assessment/Plan:      Active Hospital Problems    Diagnosis    *NSTEMI (non-ST elevated myocardial infarction)    Chest pain    S/P CABG (coronary artery bypass graft)    DM (diabetes mellitus), type 2    Hypertension    Morbid obesity    CAD (coronary artery disease)    Diverticulosis    Venous insufficiency    Hyperlipidemia    Chronic lower back pain    H/O right nephrectomy    GABBY on CPAP       * NSTEMI (non-ST elevated myocardial infarction)  Stress test results positive  S/p SIDNEY on mid RCA on 12/8/2019  ASA, statin  HbA1C pending  LDL at goal  2D echo EF 60%  Cardio on board  cw home meds  CPAP qhs    Lower GI bleed  Likely 2/2 diverticulosis  H/H q6h  Likely discharge tomorrow if H/H stable    VTE Risk Mitigation (From admission, onward)         Ordered     heparin (porcine) injection  As needed (PRN)      12/08/19 0904     heparin (porcine) 2,000 Units in sodium chloride 0.9% 500 mL  As needed (PRN)      12/08/19 0853     enoxaparin injection 40 mg  Every 12 hours      12/06/19 1046     IP VTE HIGH RISK PATIENT  Once      12/06/19 1027                      Osvaldo Boyer MD  Department of Hospital Medicine   [facility]  Date of service: 12/09/2019 11:59 AM

## 2019-12-09 NOTE — PLAN OF CARE
Cardiac, vital signs, and lab monitoring.  Possible discharge in am.  Increase activity as tolerated.  Watch for falls.  Watch for signs and symptoms of bleeding.   EKG in am.  Accuchecks per order.  Strict I&O.  Daily weights.

## 2019-12-09 NOTE — CARE UPDATE
"Readmission Prevention    DX: NSTEMI, MI-[facility] dx. PC spoke to pt and his wife, primary care giver.  Pt was discussed MI briefly, given articles on NSTEMI, MI, a [place]c cheat sheet 2019, a [place] SBB and [place] Community services and resources list for any additional resources he made need at home. (pt resides in [place]).  Pt still works and was concerned about getting things he might need since he is still employed.  PC explained he can always contact a company and ask them to put him on a "sliding scale" to pay for the item.  PT is was referred to [facility] pharmacist since he was documented as "stopping taking his statin, did not  his script".  Pt was also given PC as a contact upon discharge.      Santa CORDERO, LPN    Transitions of Care  12/9/2019, 2:54 pm  "

## 2019-12-09 NOTE — PROGRESS NOTES
Interval History:   Patient had blood in his stools.  Denies having any chest discomfort and no shortness of breath        Review of Systems     Denies Chest pain, sob, or palpitations  No recent fever, cough chills or congestion  No blood in the urine he has history of have arm diverticulosis and diverticular bleeds periodically   No myalgias  No recent arm, neck, or jaw pain  No lightheadedness or dizziness  No Double vision, blurry, vision or headache   Currently using his by CPAP mask    Objective:     Vital Signs (Most Recent):  Temp: 98.5 °F (36.9 °C) (12/09/19 0805)  Pulse: 108 (12/09/19 0805)  Resp: (!) 24 (12/09/19 0805)  BP: (!) 122/59 (12/09/19 0805)  SpO2: 96 % (12/09/19 0805) Vital Signs (24h Range):  Temp:  [97.7 °F (36.5 °C)-98.5 °F (36.9 °C)] 98.5 °F (36.9 °C)  Pulse:  [] 108  Resp:  [10-24] 24  SpO2:  [92 %-99 %] 96 %  BP: (101-138)/(50-67) 122/59     Weight: (!) 149.7 kg (330 lb 0.5 oz)  Body mass index is 46.03 kg/m².     SpO2: 96 %  O2 Device (Oxygen Therapy): room air      Intake/Output Summary (Last 24 hours) at 12/9/2019 1103  Last data filed at 12/9/2019 0700  Gross per 24 hour   Intake 640 ml   Output 2925 ml   Net -2285 ml       Lines/Drains/Airways     Peripheral Intravenous Line                 Peripheral IV - Single Lumen 12/06/19 0653 20 G Left Antecubital 3 days         Peripheral IV - Single Lumen 12/07/19 2215 20 G Anterior;Right Forearm 1 day                   aspirin  325 mg Oral Daily    clopidogrel  600 mg Oral Once    clopidogrel  75 mg Oral Daily    enoxaparin  40 mg Subcutaneous Q12H    insulin detemir U-100  15 Units Subcutaneous QHS    insulin detemir U-100  5 Units Subcutaneous Once    lisinopril  10 mg Oral Daily    pantoprazole  40 mg Oral Before breakfast    polyethylene glycol  17 g Oral Daily    pravastatin  20 mg Oral QHS         PHYSICAL EXAM:  Constitutional: Well built, well nourished in no apparent distress  Neck: no carotid bruit, no JVD  Lungs:  CTA  Chest Wall: no tenderness  Heart: regular rate and rhythm, S1, S2 normal, no murmur, click, rub or gallop   Abdomen: soft, non-tender; bowel sounds normal; no masses,  no organomegaly  Extremities: Extremities normal, no edema, right groin has no hematoma  Neuro: AAO X 3    I HAVE REVIEWED :    The vital signs, nurses notes, and all the pertinent radiology and labs.       Significant Labs:   Recent Lab Results       12/09/19  0404        Anion Gap 8     BUN, Bld 15     Calcium 8.7     Chloride 102     CO2 26     Creatinine 0.7     eGFR if African American >60.0     eGFR if non  >60.0  Comment:  Calculation used to obtain the estimated glomerular filtration  rate (eGFR) is the CKD-EPI equation.        Glucose 185     Hematocrit 29.0     Hemoglobin 10.0     Magnesium 1.7     MCH 32.4     MCHC 34.5     MCV 94     MPV 9.9     Phosphorus 3.1     Platelets 257     Potassium 4.1     RBC 3.09     RDW 13.2     Sodium 136     Troponin I 0.931  Comment:  Troponin critical result(s) called and verbal readback obtained from   Sahra Bethea RN Cardio A by [facility] 12/09/2019 04:57       WBC 11.65           Significant Imaging: ryan        I HAVE REVIEWED :    The vital signs, nurses notes, and all the pertinent radiology and labs.       ASSESSMENT & PLAN:    1.  NSTEMI (non-ST elevated myocardial infarction)  S/p stent placement in the mid right coronary artery with good results new currently on dual antiplatelet therapy with aspirin 325 and Plavix 75 he has also received Lovenox for DVT prophylaxis.      2.  Coronary artery disease status post bypass surgery:     with patent internal mammary artery to the LAD and a saphenous vein graft to the diagonal branch.  Optimize medical therapy will add low doses of isosorbide mononitrate if his blood pressure remains stable and no further bleeding is noted.    3.  Dyslipidemia;  Continue on statin therapy for I will call at this point LDL cholesterol is controlled.    4.   Type 2 diabetes  -management per hospitalist    5.  Morbid obesity  Needs aggressive diet control and weight management.    6.  Obstructive sleep apnea  Continue on CPAP he may need reassessment with the sleep study.    7.  Diverticular bleed:     Monitor H&H and if it remains stable are he can be discharged home on Plavix 75 and aspirin 81 mg daily P for the time being increase his activity on telemetry and continue on oral hydration.    I have personally interviewed and examined the patient, discussed the plan with the patient, nursing staff.

## 2019-12-09 NOTE — ASSESSMENT & PLAN NOTE
Stress test results positive  S/p SIDNEY on mid RCA on 12/8/2019  ASA, statin  HbA1C pending  LDL at goal  2D echo EF 60%  Cardio on board  cw home meds  CPAP qhs    Lower GI bleed  Likely 2/2 diverticulosis  H/H q6h  Likely discharge tomorrow if H/H stable

## 2019-12-09 NOTE — PLAN OF CARE
Plan of care, medications and safety reviewed with patient.      Problem: Adult Inpatient Plan of Care  Goal: Plan of Care Review  Outcome: Ongoing, Progressing  Goal: Patient-Specific Goal (Individualization)  Outcome: Ongoing, Progressing  Goal: Absence of Hospital-Acquired Illness or Injury  Outcome: Ongoing, Progressing  Goal: Optimal Comfort and Wellbeing  Outcome: Ongoing, Progressing  Goal: Readiness for Transition of Care  Outcome: Ongoing, Progressing  Goal: Rounds/Family Conference  Outcome: Ongoing, Progressing     Problem: Diabetes Comorbidity  Goal: Blood Glucose Level Within Desired Range  Outcome: Ongoing, Progressing     Problem: Fall Injury Risk  Goal: Absence of Fall and Fall-Related Injury  Outcome: Ongoing, Progressing

## 2019-12-09 NOTE — PLAN OF CARE
12/09/19 1442   Discharge Assessment   Assessment Type Discharge Planning Assessment   Confirmed/corrected address and phone number on facesheet? Yes   Assessment information obtained from? Patient   Prior to hospitilization cognitive status: Alert/Oriented   Prior to hospitalization functional status: Independent   Current cognitive status: Alert/Oriented   Current Functional Status: Independent   Facility Arrived From: home   Lives With spouse;other relative(s)   Able to Return to Prior Arrangements yes   Is patient able to care for self after discharge? Yes   Who are your caregiver(s) and their phone number(s)? Self [phone]   Patient's perception of discharge disposition home or selfcare   Readmission Within the Last 30 Days no previous admission in last 30 days   Patient currently being followed by outpatient case management? No   Patient currently receives any other outside agency services? No   Equipment Currently Used at Home CPAP   Part D Coverage [employer] Prescription Drug Plan   Do you have any problems affording any of your prescribed medications? No   Is the patient taking medications as prescribed? yes   Does the patient have transportation home? Yes   Transportation Anticipated family or friend will provide   Dialysis Name and Scheduled days N/A   Does the patient receive services at the [facility]mad[facility] Clinic? No   Discharge Plan A Home   DME Needed Upon Discharge  none   Patient/Family in Agreement with Plan yes

## 2019-12-10 VITALS
WEIGHT: 315 LBS | BODY MASS INDEX: 44.1 KG/M2 | SYSTOLIC BLOOD PRESSURE: 101 MMHG | TEMPERATURE: 98 F | RESPIRATION RATE: 18 BRPM | OXYGEN SATURATION: 98 % | HEIGHT: 71 IN | DIASTOLIC BLOOD PRESSURE: 55 MMHG | HEART RATE: 79 BPM

## 2019-12-10 PROBLEM — R07.9 CHEST PAIN: Status: RESOLVED | Noted: 2019-12-08 | Resolved: 2019-12-10

## 2019-12-10 LAB
ANION GAP SERPL CALC-SCNC: 9 MMOL/L (ref 8–16)
BUN SERPL-MCNC: 19 MG/DL (ref 8–23)
CALCIUM SERPL-MCNC: 9.4 MG/DL (ref 8.7–10.5)
CHLORIDE SERPL-SCNC: 102 MMOL/L (ref 95–110)
CO2 SERPL-SCNC: 26 MMOL/L (ref 23–29)
CREAT SERPL-MCNC: 0.9 MG/DL (ref 0.5–1.4)
ERYTHROCYTE [DISTWIDTH] IN BLOOD BY AUTOMATED COUNT: 13.4 % (ref 11.5–14.5)
EST. GFR  (AFRICAN AMERICAN): >60 ML/MIN/1.73 M^2
EST. GFR  (NON AFRICAN AMERICAN): >60 ML/MIN/1.73 M^2
ESTIMATED AVG GLUCOSE: 174 MG/DL (ref 68–131)
GLUCOSE SERPL-MCNC: 185 MG/DL (ref 70–110)
GLUCOSE SERPL-MCNC: 199 MG/DL (ref 70–110)
HBA1C MFR BLD HPLC: 7.7 % (ref 4.5–6.2)
HCT VFR BLD AUTO: 29.8 % (ref 40–54)
HCT VFR BLD AUTO: 30 % (ref 40–54)
HGB BLD-MCNC: 10.1 G/DL (ref 14–18)
HGB BLD-MCNC: 10.2 G/DL (ref 14–18)
MAGNESIUM SERPL-MCNC: 1.7 MG/DL (ref 1.6–2.6)
MCH RBC QN AUTO: 32.3 PG (ref 27–31)
MCHC RBC AUTO-ENTMCNC: 34 G/DL (ref 32–36)
MCV RBC AUTO: 95 FL (ref 82–98)
PHOSPHATE SERPL-MCNC: 3.6 MG/DL (ref 2.7–4.5)
PLATELET # BLD AUTO: 287 K/UL (ref 150–350)
PMV BLD AUTO: 10 FL (ref 9.2–12.9)
POTASSIUM SERPL-SCNC: 4 MMOL/L (ref 3.5–5.1)
RBC # BLD AUTO: 3.16 M/UL (ref 4.6–6.2)
SODIUM SERPL-SCNC: 137 MMOL/L (ref 136–145)
WBC # BLD AUTO: 9.96 K/UL (ref 3.9–12.7)

## 2019-12-10 PROCEDURE — 84100 ASSAY OF PHOSPHORUS: CPT

## 2019-12-10 PROCEDURE — 80048 BASIC METABOLIC PNL TOTAL CA: CPT

## 2019-12-10 PROCEDURE — 85027 COMPLETE CBC AUTOMATED: CPT

## 2019-12-10 PROCEDURE — 99900035 HC TECH TIME PER 15 MIN (STAT)

## 2019-12-10 PROCEDURE — 25000003 PHARM REV CODE 250: Performed by: INTERNAL MEDICINE

## 2019-12-10 PROCEDURE — 83735 ASSAY OF MAGNESIUM: CPT

## 2019-12-10 PROCEDURE — 36415 COLL VENOUS BLD VENIPUNCTURE: CPT

## 2019-12-10 PROCEDURE — 25000003 PHARM REV CODE 250: Performed by: FAMILY MEDICINE

## 2019-12-10 PROCEDURE — 63600175 PHARM REV CODE 636 W HCPCS: Performed by: EMERGENCY MEDICINE

## 2019-12-10 PROCEDURE — 94761 N-INVAS EAR/PLS OXIMETRY MLT: CPT

## 2019-12-10 RX ORDER — CLOPIDOGREL BISULFATE 75 MG/1
75 TABLET ORAL DAILY
Qty: 90 TABLET | Refills: 0 | Status: SHIPPED | OUTPATIENT
Start: 2019-12-11 | End: 2023-03-14

## 2019-12-10 RX ORDER — ASPIRIN 81 MG/1
81 TABLET ORAL DAILY
Qty: 90 TABLET | Refills: 0 | Status: SHIPPED | OUTPATIENT
Start: 2019-12-10 | End: 2023-03-14

## 2019-12-10 RX ORDER — METOPROLOL TARTRATE 25 MG/1
12.5 TABLET, FILM COATED ORAL 2 TIMES DAILY
Qty: 30 TABLET | Refills: 0 | Status: SHIPPED | OUTPATIENT
Start: 2019-12-10

## 2019-12-10 RX ADMIN — ASPIRIN 325 MG ORAL TABLET 325 MG: 325 PILL ORAL at 09:12

## 2019-12-10 RX ADMIN — LISINOPRIL 10 MG: 10 TABLET ORAL at 09:12

## 2019-12-10 RX ADMIN — PANTOPRAZOLE SODIUM 40 MG: 40 TABLET, DELAYED RELEASE ORAL at 05:12

## 2019-12-10 RX ADMIN — ENOXAPARIN SODIUM 40 MG: 100 INJECTION SUBCUTANEOUS at 09:12

## 2019-12-10 RX ADMIN — CLOPIDOGREL BISULFATE 75 MG: 75 TABLET, FILM COATED ORAL at 09:12

## 2019-12-10 NOTE — DISCHARGE SUMMARY
Critical access hospital Medicine  Discharge Summary    DOS:12/10/2019      Patient Name: Pelon Lazo  MRN: [id number]  Admission Date: 12/6/2019  Hospital Length of Stay: 4 days  Discharge Date and Time:  12/10/2019 4:31 PM  Attending Physician: No att. providers found   Discharging Provider: Gamaliel Alcazar MD  Primary Care Provider: Heather Lauren NP      HPI:   69-year-old male history CAD S/P CABG (2001), morbid obesity, GABBY on CPAP, s/p right nephrectomy, type 2 diabetes comes in for chest pain. Patient reports that he was in his usual health yesterday evening without acute signs of illness.  He suddenly woke up about 3:00 a.m. this morning with 5/10 left-sided nonradiating sharp chest pain that lasted for several minutes.  No aggravating factors.  Reports that pain mildly improved we stood up and went to the bathroom.  Endorses some shortness of breath with the chest pain however patient is on CPAP at night and has baseline shortness of breath when he removes his CPAP. Patient was able to go back to bed if fall back asleep after several minutes.  Reports similar symptoms about 4:00 a.m. in which he trended complex asleep again.  Symptoms occurred again at 5:00 a.m. in patient became concerned and came to the ED for further evaluation.  Of note, patient reports that he does not have hypertension, hyperlipidemia.  Patient takes blood pressure medications for cardiac and renal protective.  Patient is not taking a statin over 2 months because he forgot to  a refill.    In the ED, patient's symptoms resolved prior to come to ED.  Has that report chest pain since being in the ED.  Patient received aspirin and nitro paste.  Cardiology, Dr. Mckeon was consulted.  Initial troponins were 0.171.  EKG found no acute ST elevation or depression.    Procedure(s) (LRB):  Left heart cath w/LV (Left)  Percutaneous coronary intervention (N/A)      Hospital Course:   Patient was admitted for an NSTEMI.   Patient had a stress test that found moderate reversible ischemia of the left ventricular wall.  Patient had an angiogram on 12/08/2019 and had a SIDNEY placed on mid RCA.  Patient tolerated procedure well.  Denies any chest pain or shortness of breath after procedure.  Patient had a large painless bloody bowel movement on 12/09/2019.  H&H remained stable.  Patient has a history of diverticulosis and reports that he has not had a painless bloody bowel movement in several years.  Today upon my assessment patient denied any symptoms.  He walked around nursing station without any dyspnea, chest pain or hypotension.  He was cleared for discharge by Cardiology.  Due to his borderline low blood pressure lisinopril was discontinued and low-dose metoprolol was started.  He was discharged on aspirin and Plavix.  I counseled him extensively on importance of dual antiplatelets and consequences of stopping it without doctor's consultation.  He voiced good understanding.  He was advised to follow up with Cardiology within a week.     Review of Systems   Constitutional: Negative for chills, fatigue, fever and unexpected weight change.   HENT: Negative for sore throat.    Eyes: Negative for visual disturbance.   Respiratory: Negative for cough, chest tightness and shortness of breath.    Cardiovascular: Negative for chest pain.   Gastrointestinal: Negative for abdominal pain, constipation, diarrhea, nausea and vomiting.   Endocrine: Negative for polyuria.   Genitourinary: Negative for dysuria and hematuria.   Neurological: Negative for headaches    Physical Exam  Constitutional: He is oriented to person, place, and time. He appears well-developed and well-nourished. No distress.   Morbid obesity   HENT:   Head: Normocephalic and atraumatic.   Right Ear: External ear normal.   Left Ear: External ear normal.   Eyes: Conjunctivae and EOM are normal. Right eye exhibits no discharge. Left eye exhibits no discharge. No scleral icterus.    Neck: Normal range of motion.   Cardiovascular: Normal rate, regular rhythm and normal heart sounds. Exam reveals no gallop and no friction rub.   No murmur heard.  Pulmonary/Chest: Effort normal and breath sounds normal. No respiratory distress. He has no wheezes. He has no rales. He exhibits no tenderness.   Musculoskeletal: He exhibits edema (+1 R LE, +1 L LE). He exhibits no tenderness.   Neurological: He is alert and oriented to person, place, and time.   Skin: Skin is warm. He is not diaphoretic.   Psychiatric: He has a normal mood and affect. His behavior is normal. Judgment and thought content normal.     Nursing note and vitals reviewed     Consults:   Consults (From admission, onward)        Status Ordering Provider     Inpatient consult to Cardiology  Once     Provider:  MD Elidu Torres MICHAEL N.          No new Assessment & Plan notes have been filed under this hospital service since the last note was generated.  Service: Hospital Medicine    Final Active Diagnoses:    Diagnosis Date Noted POA    PRINCIPAL PROBLEM:  NSTEMI (non-ST elevated myocardial infarction) [I21.4] 12/06/2019 Yes    S/P CABG (coronary artery bypass graft) [Z95.1] 12/06/2019 Not Applicable     Chronic    DM (diabetes mellitus), type 2 [E11.9] 12/06/2019 Yes     Chronic    Hypertension [I10] 12/06/2019 Yes     Chronic    Morbid obesity [E66.01] 12/06/2019 Yes     Chronic    CAD (coronary artery disease) [I25.10] 12/06/2019 Yes     Chronic    Diverticulosis [K57.90] 12/06/2019 Yes     Chronic    Venous insufficiency [I87.2] 12/06/2019 Yes     Chronic    Hyperlipidemia [E78.5] 12/06/2019 Yes     Chronic    Chronic lower back pain [M54.5, G89.29] 12/06/2019 Yes     Chronic    H/O right nephrectomy [Z90.5] 12/06/2019 Not Applicable     Chronic    GABBY on CPAP [G47.33, Z99.89] 12/06/2019 Not Applicable     Chronic      Problems Resolved During this Admission:    Diagnosis Date Noted Date Resolved POA     Chest pain [R07.9] 12/08/2019 12/10/2019 Yes       Discharged Condition: fair    Disposition: Home or Self Care    Follow Up:  Follow-up Information     Heather Lauren NP In 1 week.    Specialty:  Family Medicine  Contact information:  [address]  [address]  [facility]  [place] [address]  [phone]             Eduard Mckeon MD In 1 week.    Specialty:  Cardiology  Contact information:  [address] [facility]  [address]  [place] [facility] [address]  [phone]                 Patient Instructions:      Diet Cardiac     Diet diabetic     Notify your health care provider if you experience any of the following:  redness, tenderness, or signs of infection (pain, swelling, redness, odor or green/yellow discharge around incision site)     Activity as tolerated       Significant Diagnostic Studies: Labs:   BMP:   Recent Labs   Lab 12/09/19  0404 12/10/19  0344   * 199*    137   K 4.1 4.0    102   CO2 26 26   BUN 15 19   CREATININE 0.7 0.9   CALCIUM 8.7 9.4   MG 1.7 1.7       Pending Diagnostic Studies:     Procedure Component Value Units Date/Time    EKG 12-LEAD on arrival to floor [[id number]]     Order Status:  Sent Lab Status:  No result     Echo Color Flow Doppler? Yes [[id number]]     Order Status:  Sent Lab Status:  No result          Medications:  Reconciled Home Medications:      Medication List      START taking these medications    aspirin 81 MG EC tablet  Commonly known as:  ECOTRIN  Take 1 tablet (81 mg total) by mouth once daily.  Replaces:  aspirin 325 MG tablet     clopidogrel 75 mg tablet  Commonly known as:  PLAVIX  Take 1 tablet (75 mg total) by mouth once daily.  Start taking on:  December 11, 2019     metoprolol tartrate 25 MG tablet  Commonly known as:  LOPRESSOR  Take 0.5 tablets (12.5 mg total) by mouth 2 (two) times daily.        CONTINUE taking these medications    docusate sodium 100 MG capsule  Commonly known as:  COLACE  Take 100 mg by mouth daily as  needed.     ferrous sulfate 325 mg (65 mg iron) Tab tablet  Commonly known as:  FEOSOL  Take 325 mg by mouth once daily.     glimepiride 4 MG tablet  Commonly known as:  AMARYL  Take 4 mg by mouth 2 (two) times daily.     HYDROcodone-acetaminophen 7.5-325 mg per tablet  Commonly known as:  NORCO  Take 1 tablet by mouth every 12 (twelve) hours as needed for Pain.     insulin glargine 100 units/mL (3mL) SubQ pen  Inject 10 Units into the skin every evening.     niacin 500 MG Tab  Take 500 mg by mouth once daily.     omeprazole 20 mg Tbec  Take 20 mg by mouth once daily.     pravastatin 20 MG tablet  Commonly known as:  PRAVACHOL  Take 20 mg by mouth once daily.     Silenor 6 mg Tab  Generic drug:  doxepin  Take 6 mg by mouth nightly as needed.     traZODone 50 MG tablet  Commonly known as:  DESYREL  Take 50 mg by mouth every evening. Patient states he takes 4mg.     Vitamin D2 50,000 unit Cap  Generic drug:  ergocalciferol  Take 50,000 Units by mouth every 7 days.        STOP taking these medications    aspirin 325 MG tablet  Replaced by:  aspirin 81 MG EC tablet     lisinopril 10 MG tablet            Indwelling Lines/Drains at time of discharge:   Lines/Drains/Airways     None                 Time spent on the discharge of patient: 28 minutes  Patient was seen and examined on the date of discharge and determined to be suitable for discharge.         Gamaliel Alcazar MD  Department of Hospital Medicine  [facility]

## 2019-12-10 NOTE — PROGRESS NOTES
Interval History: Mr. Lazo is doing well. He is eager to go home.         Review of Systems     Denies Chest pain, sob, or palpitations  No recent fever, cough chills or congestion  No blood in the urine or stool  No myalgias  No recent arm, neck, or jaw pain  No lightheadedness or dizziness  No Double vision, blurry, vision or headache     Objective:     Vital Signs (Most Recent):  Temp: 97.7 °F (36.5 °C) (12/10/19 0701)  Pulse: 79 (12/10/19 0828)  Resp: 18 (12/10/19 0400)  BP: (!) 101/55 (12/10/19 0906)  SpO2: 98 % (12/10/19 0400) Vital Signs (24h Range):  Temp:  [97.7 °F (36.5 °C)-99.1 °F (37.3 °C)] 97.7 °F (36.5 °C)  Pulse:  [] 79  Resp:  [16-18] 18  SpO2:  [92 %-98 %] 98 %  BP: (101-138)/(54-68) 101/55     Weight: (!) 144.4 kg (318 lb 5.5 oz)  Body mass index is 44.4 kg/m².     SpO2: 98 %  O2 Device (Oxygen Therapy): CPAP    No intake or output data in the 24 hours ending 12/10/19 1124    Lines/Drains/Airways     Peripheral Intravenous Line                 Peripheral IV - Single Lumen 12/06/19 0653 20 G Left Antecubital 4 days         Peripheral IV - Single Lumen 12/07/19 2215 20 G Anterior;Right Forearm 2 days                   aspirin  325 mg Oral Daily    clopidogrel  600 mg Oral Once    clopidogrel  75 mg Oral Daily    enoxaparin  40 mg Subcutaneous Q12H    insulin detemir U-100  15 Units Subcutaneous QHS    insulin detemir U-100  5 Units Subcutaneous Once    lisinopril  10 mg Oral Daily    pantoprazole  40 mg Oral Before breakfast    polyethylene glycol  17 g Oral Daily    pravastatin  20 mg Oral QHS         PHYSICAL EXAM:  Constitutional: Well built, well nourished in no apparent distress  Neck: no carotid bruit, no JVD  Lungs: CTA, diminished breath sounds  Chest Wall: no tenderness  Heart: regular rate and rhythm, S1, S2 normal, no murmur, click, rub or gallop   Abdomen: soft, non-tender; bowel sounds normal; no masses,  no organomegaly  Extremities: Extremities normal, no edema, no  groin hematoma noted  Neuro: AAO X 3    I HAVE REVIEWED :    The vital signs, nurses notes, and all the pertinent radiology and labs.           I HAVE REVIEWED :    The vital signs, nurses notes, and all the pertinent radiology and labs.       ASSESSMENT & PLAN:    1.  NSTEMI (non-ST elevated myocardial infarction)  S/p stent placement in the mid right coronary artery with good results new currently on dual antiplatelet therapy with aspirin 325 and Plavix 75 he has also received Lovenox for DVT prophylaxis during his hospital stay..        2.  Coronary artery disease status post bypass surgery:      with patent internal mammary artery to the LAD and a saphenous vein graft to the diagonal branch.  Optimize medical therapy will add low doses of isosorbide mononitrate if his blood pressure remains stable and no further bleeding is noted.     3.  Dyslipidemia;  Continue on statin therapy , he needs aggressive control of LDL cholesterol .     4.  Type 2 diabetes  -management per hospitalist     5.  Morbid obesity  Needs aggressive diet control and weight management.     6.  Obstructive sleep apnea  Continue on CPAP he may need reassessment with the sleep study.     7.  Diverticular bleed:      Monitor H&H and if it remains stable are he can be discharged home on Plavix 75 and aspirin  for the time being increase.  Addendum  12/10/19  Stop Lisinopril because of lower/borderline blood pressure  Start Metoprolol 12.5 mg po bid  He can be discharged and follow up in the office in 2 weeks time  I have personally interviewed and examined the patient, discussed the plan with the patient, nursing staff and the hospitalist.  I have personally interviewed and examined the patient, I have reviewed the Nurse Practitioner's history and physical, assessment, and plan. I have personally evaluated the patient at bedside and agree with the findings.

## 2019-12-10 NOTE — PLAN OF CARE
12/09/19 1947   Patient Assessment/Suction   Respiratory Effort Unlabored   All Lung Fields Breath Sounds clear   Rhythm/Pattern, Respiratory pattern regular   PRE-TX-O2   O2 Device (Oxygen Therapy) room air   SpO2 (!) 92 %   Pulse 87   Aerosol Therapy   $ Aerosol Therapy Charges PRN treatment not required

## 2019-12-10 NOTE — NURSING
Went over discharge information with the patient answered questions he had at that time. Removed 2 PIV without difficulty tips intact. Patient walked off the floor with family to go home.

## 2019-12-10 NOTE — PLAN OF CARE
12/10/19 1142   Final Note   Assessment Type Final Discharge Note   Anticipated Discharge Disposition Home   Patient with discharge orders for today to home. No CM/DC planning needs noted.

## 2019-12-11 ENCOUNTER — TELEPHONE (OUTPATIENT)
Dept: CARDIAC REHAB | Facility: HOSPITAL | Age: 69
End: 2019-12-11

## 2019-12-11 NOTE — TELEPHONE ENCOUNTER
Pelon Lazo   [id number]   12/11/2019         Spoke with: Pelon Lazo    Received Medications?:yes    Follow Up Appt?:no    Cardio Pulmonary Rehab Appt?:no    Comments: Encourage pt to call MD office to make follow up appointment. At this time, patient has refused cardiac rehab due to being a  and work schedule. Contact information given.    Padmini Estevez RN

## 2019-12-18 NOTE — CARE UPDATE
Readmission Prevention    DX NSTEMI    Follow up call x1 to patient after 12/10/2019 discharge, no issues to report. Pt has made a f/u PCP appt yet, pt has received all medications including new ones and is taking all meds at this time.    Santa CORDERO, LPN    Transitions of Care Program  12/18/2019, 4:49 pm

## 2020-03-12 RX ORDER — CLOPIDOGREL BISULFATE 75 MG/1
TABLET ORAL
Qty: 90 TABLET | Refills: 0 | OUTPATIENT
Start: 2020-03-12

## 2021-04-02 ENCOUNTER — OCCUPATIONAL HEALTH (OUTPATIENT)
Dept: URGENT CARE | Facility: CLINIC | Age: 71
End: 2021-04-02

## 2021-04-02 PROCEDURE — 80305 PR COLLECTION ONLY DRUG SCREEN: ICD-10-PCS | Mod: S$GLB,,, | Performed by: EMERGENCY MEDICINE

## 2021-04-02 PROCEDURE — 80305 DRUG TEST PRSMV DIR OPT OBS: CPT | Mod: S$GLB,,, | Performed by: EMERGENCY MEDICINE

## 2021-04-27 ENCOUNTER — TELEPHONE (OUTPATIENT)
Dept: CARDIOLOGY | Facility: CLINIC | Age: 71
End: 2021-04-27

## 2021-05-06 ENCOUNTER — TELEPHONE (OUTPATIENT)
Dept: CARDIOLOGY | Facility: CLINIC | Age: 71
End: 2021-05-06

## 2021-05-31 ENCOUNTER — OFFICE VISIT (OUTPATIENT)
Dept: CARDIOLOGY | Facility: CLINIC | Age: 71
End: 2021-05-31
Payer: MEDICARE

## 2021-05-31 VITALS
BODY MASS INDEX: 44.1 KG/M2 | HEART RATE: 76 BPM | OXYGEN SATURATION: 96 % | RESPIRATION RATE: 16 BRPM | SYSTOLIC BLOOD PRESSURE: 126 MMHG | DIASTOLIC BLOOD PRESSURE: 70 MMHG | HEIGHT: 71 IN | WEIGHT: 315 LBS

## 2021-05-31 DIAGNOSIS — Z95.1 S/P CABG (CORONARY ARTERY BYPASS GRAFT): Chronic | ICD-10-CM

## 2021-05-31 DIAGNOSIS — I21.4 NSTEMI (NON-ST ELEVATED MYOCARDIAL INFARCTION): ICD-10-CM

## 2021-05-31 DIAGNOSIS — I25.10 CORONARY ARTERY DISEASE, ANGINA PRESENCE UNSPECIFIED, UNSPECIFIED VESSEL OR LESION TYPE, UNSPECIFIED WHETHER NATIVE OR TRANSPLANTED HEART: ICD-10-CM

## 2021-05-31 DIAGNOSIS — I10 HYPERTENSION, UNSPECIFIED TYPE: ICD-10-CM

## 2021-05-31 DIAGNOSIS — I87.2 VENOUS INSUFFICIENCY: ICD-10-CM

## 2021-05-31 DIAGNOSIS — E78.5 DYSLIPIDEMIA: Primary | ICD-10-CM

## 2021-05-31 PROCEDURE — 93000 ELECTROCARDIOGRAM COMPLETE: CPT | Mod: S$GLB,,, | Performed by: SPECIALIST

## 2021-05-31 PROCEDURE — 93000 EKG 12-LEAD: ICD-10-PCS | Mod: S$GLB,,, | Performed by: SPECIALIST

## 2021-05-31 PROCEDURE — 99214 PR OFFICE/OUTPT VISIT, EST, LEVL IV, 30-39 MIN: ICD-10-PCS | Mod: S$GLB,,, | Performed by: NURSE PRACTITIONER

## 2021-05-31 PROCEDURE — 99214 OFFICE O/P EST MOD 30 MIN: CPT | Mod: S$GLB,,, | Performed by: NURSE PRACTITIONER

## 2021-09-01 ENCOUNTER — OCCUPATIONAL HEALTH (OUTPATIENT)
Dept: URGENT CARE | Facility: CLINIC | Age: 71
End: 2021-09-01
Payer: MEDICARE

## 2021-09-01 PROCEDURE — 80305 PR COLLECTION ONLY DRUG SCREEN: ICD-10-PCS | Mod: S$GLB,,, | Performed by: EMERGENCY MEDICINE

## 2021-09-01 PROCEDURE — 80305 DRUG TEST PRSMV DIR OPT OBS: CPT | Mod: S$GLB,,, | Performed by: EMERGENCY MEDICINE

## 2021-12-23 ENCOUNTER — OCCUPATIONAL HEALTH (OUTPATIENT)
Dept: URGENT CARE | Facility: CLINIC | Age: 71
End: 2021-12-23

## 2021-12-23 PROCEDURE — 80305 PR COLLECTION ONLY DRUG SCREEN: ICD-10-PCS | Mod: S$GLB,,, | Performed by: EMERGENCY MEDICINE

## 2021-12-23 PROCEDURE — 80305 DRUG TEST PRSMV DIR OPT OBS: CPT | Mod: S$GLB,,, | Performed by: EMERGENCY MEDICINE

## 2022-02-24 ENCOUNTER — OCCUPATIONAL HEALTH (OUTPATIENT)
Dept: URGENT CARE | Facility: CLINIC | Age: 72
End: 2022-02-24

## 2022-02-24 PROCEDURE — 80305 DRUG TEST PRSMV DIR OPT OBS: CPT | Mod: S$GLB,,, | Performed by: EMERGENCY MEDICINE

## 2022-02-24 PROCEDURE — 80305 PR COLLECTION ONLY DRUG SCREEN: ICD-10-PCS | Mod: S$GLB,,, | Performed by: EMERGENCY MEDICINE

## 2022-08-01 ENCOUNTER — OFFICE VISIT (OUTPATIENT)
Dept: URGENT CARE | Facility: CLINIC | Age: 72
End: 2022-08-01
Payer: MEDICARE

## 2022-08-01 VITALS
RESPIRATION RATE: 18 BRPM | TEMPERATURE: 98 F | DIASTOLIC BLOOD PRESSURE: 69 MMHG | SYSTOLIC BLOOD PRESSURE: 124 MMHG | OXYGEN SATURATION: 95 % | HEART RATE: 69 BPM

## 2022-08-01 DIAGNOSIS — S89.92XA LEFT KNEE INJURY, INITIAL ENCOUNTER: Primary | ICD-10-CM

## 2022-08-01 DIAGNOSIS — S80.212A ABRASION OF LEFT KNEE, INITIAL ENCOUNTER: ICD-10-CM
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
## 2022-08-01 PROCEDURE — 99204 PR OFFICE/OUTPT VISIT, NEW, LEVL IV, 45-59 MIN: ICD-10-PCS | Mod: S$GLB,,, | Performed by: STUDENT IN AN ORGANIZED HEALTH CARE EDUCATION/TRAINING PROGRAM

## 2022-08-01 PROCEDURE — 99204 OFFICE O/P NEW MOD 45 MIN: CPT | Mod: S$GLB,,, | Performed by: STUDENT IN AN ORGANIZED HEALTH CARE EDUCATION/TRAINING PROGRAM

## 2022-08-01 RX ORDER — KETOROLAC TROMETHAMINE 30 MG/ML
30 INJECTION, SOLUTION INTRAMUSCULAR; INTRAVENOUS
Status: DISCONTINUED | OUTPATIENT
Start: 2022-08-01 | End: 2022-08-01

## 2022-08-01 RX ORDER — MUPIROCIN 20 MG/G
OINTMENT TOPICAL 3 TIMES DAILY
Qty: 30 G | Refills: 0 | Status: SHIPPED | OUTPATIENT
Start: 2022-08-01

## 2022-08-01 RX ORDER — NAPROXEN 500 MG/1
500 TABLET ORAL 2 TIMES DAILY WITH MEALS
Qty: 20 TABLET | Refills: 0 | Status: SHIPPED | OUTPATIENT
Start: 2022-08-02 | End: 2022-08-12

## 2022-08-01 NOTE — PROGRESS NOTES
Subjective:       Patient ID: Pelon Lazo is a 71 y.o. male.    Vitals:  oral temperature is 98.2 °F (36.8 °C). His blood pressure is 124/69 and his pulse is 69. His respiration is 18 and oxygen saturation is 95%.     Chief Complaint: Knee Pain    Patient is a 71-year-old male with a past medical history of coronary artery disease, hypertension, hyperlipidemia, NSTEMI, status post CABG, type 2 diabetes, and obstructive sleep apnea who presents to clinic for evaluation of left knee injury.  Patient reports injury occurred this morning.  Patient reports was walking tripped and fell.  Patient reports landed on his left knee.  Patient states landed on concrete.  Patient states he has some road rash to his left knee.  Patient states does have some swelling and pain to left knee.  Patient reports he chronically has swelling and pain to left knee ever since his left knee replacement.  Patient reports symptoms maybe slightly worse than normal.  Patient reports his daughter convinced him to get an x-ray due to prior replacement.  Patient reports cleaned his road rash up and wrapped his knee with an Ace bandage.  Patient describes pain to be a constant dull or aching type sensation.  Patient rates pain at current a 4 on 10 scale and states as worst is a 7 on 10 scale.  Patient denies radiation of pain.  Patient reports aggravating factors to be palpation of the knee and slightly with ambulation and weight-bearing.  Patient reports alleviating factors to be rest.  Patient states has not had any over-the-counter medications for symptoms at this point.  Patient denies any abnormal sensation including numbness or tingling of the left lower extremity.  Patient denies any active bleeding from the road rash to his knee.    Knee Pain   The incident occurred 6 to 12 hours ago. The injury mechanism is unknown. The pain is present in the left knee. Pertinent negatives include no numbness.       Constitution: Negative. Negative for  chills, sweating, fatigue and fever.   HENT: Negative.    Neck: neck negative.   Cardiovascular: Negative.  Negative for chest pain and palpitations.   Eyes: Negative.    Respiratory: Negative.  Negative for chest tightness, cough and shortness of breath.    Gastrointestinal: Negative.  Negative for abdominal pain, nausea, vomiting and diarrhea.   Endocrine: negative.   Genitourinary: Negative.    Musculoskeletal: Positive for trauma (Fall), joint pain (Left knee) and joint swelling (Left knee). Negative for abnormal ROM of joint and pain with walking.   Skin: Positive for abrasion (Left knee) and erythema (Left knee).   Allergic/Immunologic: Negative.    Neurological: Negative.  Negative for dizziness, light-headedness, passing out, headaches, disorientation, altered mental status, numbness and tingling.   Hematologic/Lymphatic: Negative.    Psychiatric/Behavioral: Negative.  Negative for altered mental status, disorientation and confusion.       Objective:      Physical Exam   Constitutional: He is oriented to person, place, and time. He appears well-developed. He is cooperative.  Non-toxic appearance. He does not appear ill. No distress.   HENT:   Head: Normocephalic and atraumatic.   Ears:   Right Ear: Hearing, tympanic membrane, external ear and ear canal normal.   Left Ear: Hearing, tympanic membrane, external ear and ear canal normal.   Nose: Nose normal. No mucosal edema, rhinorrhea or nasal deformity. No epistaxis. Right sinus exhibits no maxillary sinus tenderness and no frontal sinus tenderness. Left sinus exhibits no maxillary sinus tenderness and no frontal sinus tenderness.   Mouth/Throat: Uvula is midline, oropharynx is clear and moist and mucous membranes are normal. No trismus in the jaw. Normal dentition. No uvula swelling. No posterior oropharyngeal erythema.   Eyes: Conjunctivae and lids are normal. Pupils are equal, round, and reactive to light. Right eye exhibits no discharge. Left eye exhibits  no discharge. No scleral icterus.   Neck: Trachea normal and phonation normal. Neck supple. No neck rigidity present.   Cardiovascular: Normal rate, regular rhythm and normal pulses.   Pulmonary/Chest: Effort normal and breath sounds normal. No respiratory distress. He has no wheezes. He has no rhonchi. He has no rales.   Abdominal: Normal appearance and bowel sounds are normal. He exhibits no distension. Soft. There is no abdominal tenderness.   Musculoskeletal: Normal range of motion.         General: Signs of injury present. No deformity. Normal range of motion.      Left knee: He exhibits swelling (Mild), erythema (Associated with abrasions) and bony tenderness. He exhibits normal range of motion, normal alignment and normal patellar mobility. Tenderness found. Medial joint line and lateral joint line tenderness noted.      Cervical back: He exhibits no tenderness.   Neurological: He is alert and oriented to person, place, and time. He exhibits normal muscle tone. Coordination normal.   Skin: Skin is warm, dry, not diaphoretic and not pale. Capillary refill takes 2 to 3 seconds. abrasion (Left anterior knee measuring approximately 8 cm by 6 cm) and erythema (Left knee)   Psychiatric: His speech is normal and behavior is normal. Judgment and thought content normal.   Nursing note and vitals reviewed.        Assessment:       1. Left knee injury, initial encounter    2. Abrasion of left knee, initial encounter    3. Fall, initial encounter          Plan:         Left knee injury, initial encounter  -     XR KNEE 3 VIEW LEFT; Future; Expected date: 08/01/2022    Abrasion of left knee, initial encounter    Fall, initial encounter    Other orders  -     Discontinue: ketorolac injection 30 mg  -     naproxen (NAPROSYN) 500 MG tablet; Take 1 tablet (500 mg total) by mouth 2 (two) times daily with meals. for 10 days  Dispense: 20 tablet; Refill: 0  -     mupirocin (BACTROBAN) 2 % ointment; Apply topically 3 (three)  times daily.  Dispense: 30 g; Refill: 0                 X-ray left knee: Postoperative changes of total knee arthroplasty.  Hardware appears well aligned and intact without radiographic evidence of an acute complication.  No acute fracture or dislocation.  Moderate joint effusion.  Patellar enthesophyte formation.  Calcific atherosclerosis.   Wound cleaned in clinic.  Bactroban ointment applied.  Ace bandage applied to left knee in clinic.  Patient refused need for crutches.  Rest.  Ice.  Compression.  Elevation.  Wound care as directed.  Take medications as prescribed.  Use of no other NSAIDs while on naproxen; may rotate with Tylenol.  Patient refused Toradol injection in clinic.  Follow-up with PCP within 1-2 days.  Recommend follow-up with previously established orthopedic in 1 week.  Return to clinic as needed.  To ED for any new acutely worsening symptoms.  Patient in agreement with plan of care.    DISCLAIMER: Please note that my documentation in this Electronic Healthcare Record was produced using speech recognition software and therefore may contain errors related to that software system.These could include grammar, punctuation and spelling errors or the inclusion/exclusion of phrases that were not intended. Garbled syntax, mangled pronouns, and other bizarre constructions may be attributed to that software system.

## 2022-08-18 ENCOUNTER — OFFICE VISIT (OUTPATIENT)
Dept: URGENT CARE | Facility: CLINIC | Age: 72
End: 2022-08-18
Payer: MEDICARE

## 2022-08-18 VITALS
RESPIRATION RATE: 18 BRPM | OXYGEN SATURATION: 95 % | HEART RATE: 72 BPM | SYSTOLIC BLOOD PRESSURE: 125 MMHG | TEMPERATURE: 99 F | DIASTOLIC BLOOD PRESSURE: 69 MMHG

## 2022-08-18 DIAGNOSIS — L03.116 CELLULITIS OF LEFT ANTERIOR LOWER LEG: Primary | ICD-10-CM

## 2022-08-18 DIAGNOSIS — L03.119 RECURRENT CELLULITIS OF LOWER LEG: ICD-10-CM

## 2022-08-18 PROCEDURE — 99213 PR OFFICE/OUTPT VISIT, EST, LEVL III, 20-29 MIN: ICD-10-PCS | Mod: S$GLB,,, | Performed by: STUDENT IN AN ORGANIZED HEALTH CARE EDUCATION/TRAINING PROGRAM

## 2022-08-18 PROCEDURE — 99213 OFFICE O/P EST LOW 20 MIN: CPT | Mod: S$GLB,,, | Performed by: STUDENT IN AN ORGANIZED HEALTH CARE EDUCATION/TRAINING PROGRAM

## 2022-08-18 RX ORDER — CLINDAMYCIN HYDROCHLORIDE 300 MG/1
300 CAPSULE ORAL 3 TIMES DAILY
Qty: 30 CAPSULE | Refills: 0 | Status: SHIPPED | OUTPATIENT
Start: 2022-08-18 | End: 2022-08-28

## 2022-08-18 NOTE — PROGRESS NOTES
Subjective:       Patient ID: Pelon Lazo is a 71 y.o. male.    Vitals:  oral temperature is 98.5 °F (36.9 °C). His blood pressure is 125/69 and his pulse is 72. His respiration is 18 and oxygen saturation is 95%.     Chief Complaint: Recurrent Skin Infections    Patient is a 71-year-old male with a past medical history of coronary artery disease, hypertension, hyperlipidemia, NSTEMI, status post CABG, type 2 diabetes, and obstructive sleep apnea who presents to clinic for evaluation of cellulitis.  Patient reports history of recurrent skin infections to include cellulitis.  Patient states he typically gets cellulitis of his lower legs a few times a year.  Patient states he typically has be put on clindamycin.  Patient states he last had cellulitis a few months ago.  Patient states symptoms identical to then.  Patient states symptoms are currently in his left lower leg.  Patient complaining of left lower leg pain, swelling, redness, and warmth.  Patient states pain is aggravated by prolonged weight-bearing and ambulation and relieved by rest.  Patient states swelling also goes down whenever he props his legs up however starts swelling again when he stands and walks.  Patient denies any acute fever or chills, chest pain or shortness of breath, abdominal pain, nausea or vomiting, or any headaches or dizziness.      Constitution: Negative. Negative for chills, sweating, fatigue and fever.   HENT: Negative.    Neck: neck negative.   Cardiovascular: Negative.  Negative for chest pain and palpitations.   Eyes: Negative.    Respiratory: Negative.  Negative for chest tightness, cough and shortness of breath.    Gastrointestinal: Negative.  Negative for abdominal pain, nausea, vomiting and diarrhea.   Endocrine: negative.   Musculoskeletal: Positive for joint pain (Left lower leg), joint swelling (Left lower leg) and pain with walking. Negative for trauma and abnormal ROM of joint.   Skin: Positive for erythema (Left  lower leg).        Reports cellulitis of left lower leg; reports recurrent cellulitis infections.   Allergic/Immunologic: Negative.    Neurological: Negative.  Negative for dizziness, headaches, disorientation, altered mental status, numbness and tingling.   Hematologic/Lymphatic: Negative.    Psychiatric/Behavioral: Negative.  Negative for altered mental status, disorientation and confusion.       Objective:      Physical Exam   Constitutional: He is oriented to person, place, and time. He appears well-developed. He is cooperative.  Non-toxic appearance. He does not appear ill. No distress. obesity  HENT:   Head: Normocephalic and atraumatic.   Ears:   Right Ear: Hearing, tympanic membrane, external ear and ear canal normal.   Left Ear: Hearing, tympanic membrane, external ear and ear canal normal.   Nose: Nose normal. No mucosal edema, rhinorrhea or nasal deformity. No epistaxis. Right sinus exhibits no maxillary sinus tenderness and no frontal sinus tenderness. Left sinus exhibits no maxillary sinus tenderness and no frontal sinus tenderness.   Mouth/Throat: Uvula is midline, oropharynx is clear and moist and mucous membranes are normal. No trismus in the jaw. Normal dentition. No uvula swelling. No posterior oropharyngeal erythema.   Eyes: Conjunctivae and lids are normal. Pupils are equal, round, and reactive to light. Right eye exhibits no discharge. Left eye exhibits no discharge. No scleral icterus.   Neck: Trachea normal and phonation normal. Neck supple.   Cardiovascular: Normal rate, regular rhythm, normal heart sounds and normal pulses.   Pulmonary/Chest: Effort normal and breath sounds normal. No respiratory distress. He has no wheezes. He has no rales.   Abdominal: Normal appearance and bowel sounds are normal. He exhibits no distension. Soft. There is no abdominal tenderness.   Musculoskeletal: Normal range of motion.         General: No deformity. Normal range of motion.      Left lower leg: He  exhibits tenderness and swelling (With associated erythema and warmth.).        Legs:    Neurological: He is alert and oriented to person, place, and time. He exhibits normal muscle tone. Coordination normal.   Skin: Skin is warm, dry, intact, not diaphoretic and not pale. Capillary refill takes 2 to 3 seconds. Abrasions - lower ext.:  Knee (left)erythema (Left lower leg)   Psychiatric: His speech is normal and behavior is normal. Judgment and thought content normal.   Nursing note and vitals reviewed.        Assessment:       1. Cellulitis of left anterior lower leg    2. Recurrent cellulitis of lower leg          Plan:         Cellulitis of left anterior lower leg    Recurrent cellulitis of lower leg    Other orders  -     clindamycin (CLEOCIN) 300 MG capsule; Take 1 capsule (300 mg total) by mouth 3 (three) times daily. for 10 days  Dispense: 30 capsule; Refill: 0                 Take medications as prescribed.    Tylenol per package instructions for any pain or fever.    Recommend elevation of lower extremities as directed.    Recommend following up with PCP within 1-2 days.    Return to clinic as needed.    To ED for any new or acutely worsening symptoms.    Patient in agreement with plan of care.    DISCLAIMER: Please note that my documentation in this Electronic Healthcare Record was produced using speech recognition software and therefore may contain errors related to that software system.These could include grammar, punctuation and spelling errors or the inclusion/exclusion of phrases that were not intended. Garbled syntax, mangled pronouns, and other bizarre constructions may be attributed to that software system.

## 2022-11-21 ENCOUNTER — OFFICE VISIT (OUTPATIENT)
Dept: URGENT CARE | Facility: CLINIC | Age: 72
End: 2022-11-21
Payer: MEDICARE

## 2022-11-21 VITALS
SYSTOLIC BLOOD PRESSURE: 122 MMHG | BODY MASS INDEX: 44.1 KG/M2 | RESPIRATION RATE: 18 BRPM | HEART RATE: 83 BPM | HEIGHT: 71 IN | OXYGEN SATURATION: 96 % | TEMPERATURE: 99 F | WEIGHT: 315 LBS | DIASTOLIC BLOOD PRESSURE: 72 MMHG

## 2022-11-21 DIAGNOSIS — R60.0 BILATERAL LOWER EXTREMITY EDEMA: Primary | ICD-10-CM

## 2022-11-21 DIAGNOSIS — R53.82 CHRONIC FATIGUE: ICD-10-CM

## 2022-11-21 PROCEDURE — 99213 PR OFFICE/OUTPT VISIT, EST, LEVL III, 20-29 MIN: ICD-10-PCS | Mod: S$GLB,,, | Performed by: STUDENT IN AN ORGANIZED HEALTH CARE EDUCATION/TRAINING PROGRAM

## 2022-11-21 PROCEDURE — 99213 OFFICE O/P EST LOW 20 MIN: CPT | Mod: S$GLB,,, | Performed by: STUDENT IN AN ORGANIZED HEALTH CARE EDUCATION/TRAINING PROGRAM

## 2022-11-21 NOTE — PROGRESS NOTES
"Subjective:       Patient ID: Pelon Lazo is a 71 y.o. male.    Vitals:  height is 5' 11" (1.803 m) and weight is 146.1 kg (322 lb) (abnormal). His oral temperature is 98.5 °F (36.9 °C). His blood pressure is 122/72 and his pulse is 83. His respiration is 18 and oxygen saturation is 96%.     Chief Complaint: Leg Swelling    Patient is a 71-year-old male with a past medical history of hypertension, coronary artery disease, status post CABG, NSTEMI, hyperlipidemia, type 2 diabetes, obstructive sleep apnea, and diverticulosis who presents to clinic for evaluation of leg swelling and fatigue.  Patient states that he has felt fatigued since his COVID diagnosis approximately 3 months ago.  Patient states some days he has good days and other days he has bad days.  Patient states that he has intermittent episodes of leg swelling as well.  Patient states intermittently has recurrent skin infection such as cellulitis.  Patient states he does not believe he has cellulitis at this time because he does not have the discoloration and warmth to his legs.  Patient states his legs just swell and he has difficulty finding shoes that fit.  Patient states that he has attempted to get in with his primary care office however is unable to do so.  Patient states he has been to different locations however no one is able to get him in for weeks.  Patient states that he believes he possibly needs blood work and testing to see why he is fatigued and continues to get leg swelling.  Patient states that he has not had any acute fever or chills, headaches or dizziness, generalized body aches (however states he has chronic back pain), ear pain, nasal sinus congestion, sore throat, chest pain or palpitations, dyspnea on exertion, shortness of breath or cough, abdominal pain, nausea or vomiting, diarrhea, rash, or change in mentation.      Constitution: Positive for fatigue. Negative for chills, sweating and fever.   HENT: Negative.  Negative " for ear pain, congestion and sore throat.    Neck: neck negative.   Cardiovascular:  Positive for leg swelling. Negative for chest pain, palpitations and sob on exertion.   Eyes: Negative.    Respiratory: Negative.  Negative for chest tightness, cough and shortness of breath.    Gastrointestinal: Negative.  Negative for abdominal pain, nausea, vomiting and diarrhea.   Endocrine: negative.   Genitourinary: Negative.    Musculoskeletal:  Positive for back pain (Chronic).   Skin: Negative.  Negative for color change, pale, rash and erythema.   Allergic/Immunologic: Negative.    Neurological: Negative.  Negative for dizziness, light-headedness, passing out, headaches, disorientation and altered mental status.   Hematologic/Lymphatic: Negative.    Psychiatric/Behavioral: Negative.  Negative for altered mental status, disorientation and confusion.      Objective:      Physical Exam   Constitutional: He is oriented to person, place, and time. He appears well-developed. He is cooperative.  Non-toxic appearance. He does not appear ill. No distress. obesity  HENT:   Head: Normocephalic and atraumatic.   Ears:   Right Ear: Hearing, tympanic membrane, external ear and ear canal normal.   Left Ear: Hearing, tympanic membrane, external ear and ear canal normal.   Nose: Nose normal. No mucosal edema, rhinorrhea, nasal deformity or congestion. No epistaxis. Right sinus exhibits no maxillary sinus tenderness and no frontal sinus tenderness. Left sinus exhibits no maxillary sinus tenderness and no frontal sinus tenderness.   Mouth/Throat: Uvula is midline, oropharynx is clear and moist and mucous membranes are normal. Mucous membranes are moist. No trismus in the jaw. Normal dentition. No uvula swelling. No oropharyngeal exudate or posterior oropharyngeal erythema. Oropharynx is clear.   Eyes: Conjunctivae and lids are normal. Pupils are equal, round, and reactive to light. Right eye exhibits no discharge. Left eye exhibits no  discharge. No scleral icterus.   Neck: Trachea normal and phonation normal. Neck supple. No neck rigidity present.   Cardiovascular: Normal rate, regular rhythm and normal pulses.   Murmur heard.  Pulmonary/Chest: Effort normal and breath sounds normal. No respiratory distress (Unlabored.  Equal rise and fall of chest.  Able speak in full complete sentences.  No adventitious breath sounds noted.). He has no wheezes. He has no rhonchi. He has no rales.   Abdominal: Normal appearance and bowel sounds are normal. He exhibits distension (Generalized distention). Soft. There is no abdominal tenderness.   Musculoskeletal: Normal range of motion.         General: No deformity. Normal range of motion.      Cervical back: He exhibits no tenderness.      Right lower leg: Edema present.      Left lower leg: Edema present.   Lymphadenopathy:     He has no cervical adenopathy.   Neurological: He is alert and oriented to person, place, and time. He exhibits normal muscle tone. Coordination normal.   Skin: Skin is warm, dry, intact, not diaphoretic, not pale and no rash. Capillary refill takes 2 to 3 seconds. No erythema   Psychiatric: His speech is normal and behavior is normal. Judgment and thought content normal.   Nursing note and vitals reviewed.      Assessment:       1. Bilateral lower extremity edema    2. Chronic fatigue          Plan:         Bilateral lower extremity edema    Chronic fatigue               Call placed to patient's primary care office (Dr. Moody), assisted patient in scheduled appointment for tomorrow November 22, 2022 at 11:40 a.m..    Continue medications as previously prescribed.  Follow-up with PCP as scheduled.    Return to clinic as needed.    To ED for any new or acutely worsening symptoms.    Patient in agreement with plan of care.    DISCLAIMER: Please note that my documentation in this Electronic Healthcare Record was produced using speech recognition software and therefore may contain errors  related to that software system.These could include grammar, punctuation and spelling errors or the inclusion/exclusion of phrases that were not intended. Garbled syntax, mangled pronouns, and other bizarre constructions may be attributed to that software system.

## 2023-03-14 ENCOUNTER — OFFICE VISIT (OUTPATIENT)
Dept: CARDIOLOGY | Facility: CLINIC | Age: 73
End: 2023-03-14
Payer: MEDICARE

## 2023-03-14 VITALS
WEIGHT: 315 LBS | BODY MASS INDEX: 44.1 KG/M2 | HEIGHT: 71 IN | OXYGEN SATURATION: 97 % | HEART RATE: 57 BPM | SYSTOLIC BLOOD PRESSURE: 120 MMHG | DIASTOLIC BLOOD PRESSURE: 66 MMHG

## 2023-03-14 DIAGNOSIS — I21.4 NSTEMI (NON-ST ELEVATED MYOCARDIAL INFARCTION): ICD-10-CM

## 2023-03-14 DIAGNOSIS — I25.10 CORONARY ARTERY DISEASE INVOLVING NATIVE CORONARY ARTERY OF NATIVE HEART WITHOUT ANGINA PECTORIS: ICD-10-CM

## 2023-03-14 DIAGNOSIS — Z95.1 S/P CABG (CORONARY ARTERY BYPASS GRAFT): Chronic | ICD-10-CM

## 2023-03-14 DIAGNOSIS — E78.5 DYSLIPIDEMIA: Primary | ICD-10-CM

## 2023-03-14 DIAGNOSIS — E08.00 DIABETES MELLITUS DUE TO UNDERLYING CONDITION WITH HYPEROSMOLARITY WITHOUT COMA, WITHOUT LONG-TERM CURRENT USE OF INSULIN: ICD-10-CM

## 2023-03-14 PROCEDURE — 99214 OFFICE O/P EST MOD 30 MIN: CPT | Mod: S$PBB,,, | Performed by: NURSE PRACTITIONER

## 2023-03-14 PROCEDURE — 99214 OFFICE O/P EST MOD 30 MIN: CPT | Mod: PBBFAC,PN | Performed by: NURSE PRACTITIONER

## 2023-03-14 PROCEDURE — 99214 PR OFFICE/OUTPT VISIT, EST, LEVL IV, 30-39 MIN: ICD-10-PCS | Mod: S$PBB,,, | Performed by: NURSE PRACTITIONER

## 2023-03-14 PROCEDURE — 99999 PR PBB SHADOW E&M-EST. PATIENT-LVL IV: ICD-10-PCS | Mod: PBBFAC,,, | Performed by: NURSE PRACTITIONER

## 2023-03-14 PROCEDURE — 99999 PR PBB SHADOW E&M-EST. PATIENT-LVL IV: CPT | Mod: PBBFAC,,, | Performed by: NURSE PRACTITIONER

## 2023-03-14 RX ORDER — ASPIRIN 325 MG
325 TABLET, DELAYED RELEASE (ENTERIC COATED) ORAL DAILY
Qty: 90 TABLET | Refills: 0 | Status: SHIPPED | OUTPATIENT
Start: 2023-03-14 | End: 2023-10-11

## 2023-03-14 NOTE — PROGRESS NOTES
Subjective:    Patient ID:  Pelon Lazo is a 72 y.o. male patient here for evaluation Follow-up      History of Present Illness:     Patient is here for follow-up visit  Denies chest pain or shortness of breath.  Denies recent fever cough chills or congestion.  Denies blood in the urine or blood in the stool.  Denies myalgias  Denies orthopnea or peripheral edema  Denies nausea vomiting or dyspepsia  No recent arm neck or jaw pain.    No lightheadedness or dizziness  Only complaint is chronic back pain        Review of patient's allergies indicates:  No Known Allergies    History reviewed. No pertinent past medical history.  Past Surgical History:   Procedure Laterality Date    ANGIOGRAM, CORONARY, WITH LEFT HEART CATHETERIZATION Left 12/8/2019    Procedure: Left heart cath w/LV;  Surgeon: Kaiden Camargo MD;  Location: [facility] CATH/EP LAB;  Service: Cardiology;  Laterality: Left;     Social History     Tobacco Use    Smoking status: Never    Smokeless tobacco: Never        Review of Systems:    As noted in HPI in addition                   Objective        Vitals:    03/14/23 1420   BP: 120/66   Pulse: (!) 57       LIPIDS - LAST 2   Lab Results   Component Value Date    CHOL 135 12/07/2019    HDL 22 (L) 12/07/2019    LDLCALC 72.0 12/07/2019    TRIG 205 (H) 12/07/2019    CHOLHDL 16.3 (L) 12/07/2019       CBC - LAST 2  Lab Results   Component Value Date    WBC 9.96 12/10/2019    WBC 11.65 12/09/2019    RBC 3.16 (L) 12/10/2019    RBC 3.09 (L) 12/09/2019    HGB 10.2 (L) 12/10/2019    HGB 10.1 (L) 12/09/2019    HCT 30.0 (L) 12/10/2019    HCT 29.8 (L) 12/09/2019    MCV 95 12/10/2019    MCV 94 12/09/2019    MCH 32.3 (H) 12/10/2019    MCH 32.4 (H) 12/09/2019    MCHC 34.0 12/10/2019    MCHC 34.5 12/09/2019    RDW 13.4 12/10/2019    RDW 13.2 12/09/2019     12/10/2019     12/09/2019    MPV 10.0 12/10/2019    MPV 9.9 12/09/2019    GRAN 5.0 12/08/2019    GRAN 56.4 12/08/2019    LYMPH 2.7 12/08/2019    LYMPH  31.0 12/08/2019    MONO 0.8 12/08/2019    MONO 8.6 12/08/2019    BASO 0.06 12/08/2019    BASO 0.06 12/06/2019    NRBC 0 12/08/2019    NRBC 0 12/06/2019       CHEMISTRY & LIVER FUNCTION - LAST 2  Lab Results   Component Value Date     12/10/2019     12/09/2019    K 4.0 12/10/2019    K 4.1 12/09/2019     12/10/2019     12/09/2019    CO2 26 12/10/2019    CO2 26 12/09/2019    ANIONGAP 9 12/10/2019    ANIONGAP 8 12/09/2019    BUN 19 12/10/2019    BUN 15 12/09/2019    CREATININE 0.9 12/10/2019    CREATININE 0.7 12/09/2019     (H) 12/10/2019     (H) 12/09/2019    CALCIUM 9.4 12/10/2019    CALCIUM 8.7 12/09/2019    MG 1.7 12/10/2019    MG 1.7 12/09/2019    ALBUMIN 4.1 12/06/2019    PROT 7.1 12/06/2019    ALKPHOS 44 (L) 12/06/2019    ALT 22 12/06/2019    AST 18 12/06/2019    BILITOT 0.9 12/06/2019        CARDIAC PROFILE - LAST 2  Lab Results   Component Value Date    BNP 32 12/06/2019    TROPONINI 0.931 (HH) 12/09/2019    TROPONINI 0.559 (HH) 12/06/2019        COAGULATION - LAST 2  Lab Results   Component Value Date    LABPT 13.0 12/06/2019    INR 1.0 12/06/2019       ENDOCRINE & PSA - LAST 2  Lab Results   Component Value Date    HGBA1C 7.7 (H) 12/06/2019    TSH 2.580 12/06/2019        ECHOCARDIOGRAM RESULTS  Results for orders placed during the hospital encounter of 12/06/19    Echo Color Flow Doppler? Yes    Interpretation Summary  · Moderate concentric left ventricular hypertrophy.  · Normal left ventricular systolic function. The estimated ejection fraction is 60%  · Grade I (mild) left ventricular diastolic dysfunction consistent with impaired relaxation.  · Normal right ventricular systolic function.  · Mild left atrial enlargement.      CURRENT/PREVIOUS VISIT EKG  Results for orders placed or performed in visit on 05/31/21   IN OFFICE EKG 12-LEAD (to [place])    Collection Time: 05/31/21  2:29 PM    Narrative    Test Reason : E78.5,    Vent. Rate : 075 BPM     Atrial Rate : 075  BPM     P-R Int : 178 ms          QRS Dur : 102 ms      QT Int : 432 ms       P-R-T Axes : 041 003 095 degrees     QTc Int : 482 ms    Normal sinus rhythm  Abnormal QRS-T angle, consider primary T wave abnormality  Prolonged QT  Abnormal ECG  When compared with ECG of 09-DEC-2019 07:18,  No significant change was found  Confirmed by Osvaldo Ibarra MD ([id number]) on 6/21/2021 8:52:07 AM    Referred By:             Confirmed By:Osvaldo Ibarra MD     No valid procedures specified.   Results for orders placed during the hospital encounter of 12/06/19    Treadmill Stress Test    Interpretation Summary    The EKG portion of this study is positive for ischemia.    The patient reported no chest pain during the stress test.    ECG Stress Nuclear portion of this study will be reported separately.        PHYSICAL EXAM  CONSTITUTIONAL: Well built, well nourished in no apparent distress  NECK: no carotid bruit, no JVD  LUNGS: CTA  CHEST WALL: no tenderness  HEART: regular rate and rhythm, S1, S2 normal, no murmur, click, rub or gallop   ABDOMEN: soft, non-tender; bowel sounds normal; no masses,  no organomegaly  EXTREMITIES: Extremities normal, no edema, no calf tenderness noted  NEURO: AAO X 3    I HAVE REVIEWED :    The vital signs, nurses notes, and all the pertinent radiology and labs.        Current Outpatient Medications   Medication Instructions    aspirin (ECOTRIN) 325 mg, Oral, Daily    docusate sodium (COLACE) 100 mg, Oral, Daily PRN    doxepin (SILENOR) 6 mg, Oral, Nightly PRN    ergocalciferol (ERGOCALCIFEROL) 50,000 Units, Oral, Every 7 days    ferrous sulfate (FEOSOL) 325 mg, Oral, Daily    glimepiride (AMARYL) 4 mg, Oral, 2 times daily    HYDROcodone-acetaminophen (NORCO) 7.5-325 mg per tablet 1 tablet, Oral, Every 12 hours PRN    insulin 50 Units, Subcutaneous, Nightly    metoprolol tartrate (LOPRESSOR) 12.5 mg, Oral, 2 times daily    mupirocin (BACTROBAN) 2 % ointment Topical (Top), 3 times daily    niacin 500  mg, Oral, Daily    omeprazole 20 mg, Oral, Daily    pravastatin (PRAVACHOL) 20 mg, Oral, Daily    traZODone (DESYREL) 50 mg, Oral, Nightly, Patient states he takes 4mg.           Assessment & Plan     NSTEMI (non-ST elevated myocardial infarction)  H/O NSTEMI PCI RCA  Now on  mg daily  Off plavix because of diverticulitis    Dyslipidemia  Continue current regimen  Check labs to include CMP and Lipid panel    S/P CABG (coronary artery bypass graft)  Stable No anginal symptoms    DM (diabetes mellitus), type 2  Per PCP          No follow-ups on file.

## 2023-06-20 ENCOUNTER — OCCUPATIONAL HEALTH (OUTPATIENT)
Dept: URGENT CARE | Facility: CLINIC | Age: 73
End: 2023-06-20

## 2023-06-20 ENCOUNTER — OCCUPATIONAL HEALTH (OUTPATIENT)
Dept: URGENT CARE | Facility: CLINIC | Age: 73
End: 2023-06-20
Payer: MEDICARE

## 2023-06-20 PROCEDURE — 82075 CHG ASSAY OF BREATH ETHANOL: ICD-10-PCS | Mod: S$GLB,,, | Performed by: EMERGENCY MEDICINE

## 2023-06-20 PROCEDURE — 82075 ASSAY OF BREATH ETHANOL: CPT | Mod: S$GLB,,, | Performed by: EMERGENCY MEDICINE

## 2023-06-20 PROCEDURE — 80305 PR COLLECTION ONLY DRUG SCREEN: ICD-10-PCS | Mod: S$GLB,,, | Performed by: EMERGENCY MEDICINE

## 2023-06-20 PROCEDURE — 80305 DRUG TEST PRSMV DIR OPT OBS: CPT | Mod: S$GLB,,, | Performed by: EMERGENCY MEDICINE

## 2023-09-30 DIAGNOSIS — I25.10 CORONARY ARTERY DISEASE INVOLVING NATIVE CORONARY ARTERY OF NATIVE HEART WITHOUT ANGINA PECTORIS: ICD-10-CM

## 2023-10-11 RX ORDER — ASPIRIN 325 MG
325 TABLET, DELAYED RELEASE (ENTERIC COATED) ORAL
Qty: 90 TABLET | Refills: 0 | Status: SHIPPED | OUTPATIENT
Start: 2023-10-11 | End: 2024-01-09 | Stop reason: SDUPTHER

## 2024-01-06 DIAGNOSIS — I25.10 CORONARY ARTERY DISEASE INVOLVING NATIVE CORONARY ARTERY OF NATIVE HEART WITHOUT ANGINA PECTORIS: ICD-10-CM

## 2024-01-09 RX ORDER — ASPIRIN 325 MG
325 TABLET, DELAYED RELEASE (ENTERIC COATED) ORAL
Qty: 90 TABLET | Refills: 0 | Status: SHIPPED | OUTPATIENT
Start: 2024-01-09

## 2024-03-26 ENCOUNTER — OFFICE VISIT (OUTPATIENT)
Dept: CARDIOLOGY | Facility: CLINIC | Age: 74
End: 2024-03-26
Payer: MEDICARE

## 2024-03-26 VITALS
SYSTOLIC BLOOD PRESSURE: 142 MMHG | BODY MASS INDEX: 44.1 KG/M2 | HEART RATE: 95 BPM | HEIGHT: 71 IN | WEIGHT: 315 LBS | DIASTOLIC BLOOD PRESSURE: 73 MMHG

## 2024-03-26 DIAGNOSIS — E66.01 MORBID OBESITY: ICD-10-CM

## 2024-03-26 DIAGNOSIS — Z95.1 S/P CABG (CORONARY ARTERY BYPASS GRAFT): ICD-10-CM

## 2024-03-26 DIAGNOSIS — E78.5 DYSLIPIDEMIA: ICD-10-CM

## 2024-03-26 DIAGNOSIS — E08.00 DIABETES MELLITUS DUE TO UNDERLYING CONDITION WITH HYPEROSMOLARITY WITHOUT COMA, WITHOUT LONG-TERM CURRENT USE OF INSULIN: ICD-10-CM

## 2024-03-26 DIAGNOSIS — I87.2 VENOUS INSUFFICIENCY: Chronic | ICD-10-CM

## 2024-03-26 DIAGNOSIS — I25.110 ATHEROSCLEROSIS OF NATIVE CORONARY ARTERY OF NATIVE HEART WITH UNSTABLE ANGINA PECTORIS: ICD-10-CM

## 2024-03-26 DIAGNOSIS — E11.9 DIABETES MELLITUS WITHOUT COMPLICATION: Primary | ICD-10-CM

## 2024-03-26 DIAGNOSIS — R06.09 DOE (DYSPNEA ON EXERTION): ICD-10-CM

## 2024-03-26 DIAGNOSIS — I25.10 CORONARY ARTERY DISEASE INVOLVING NATIVE CORONARY ARTERY OF NATIVE HEART, UNSPECIFIED WHETHER ANGINA PRESENT: ICD-10-CM

## 2024-03-26 DIAGNOSIS — R94.31 NONSPECIFIC ABNORMAL ELECTROCARDIOGRAM (ECG) (EKG): Primary | ICD-10-CM

## 2024-03-26 DIAGNOSIS — R53.83 FATIGUE, UNSPECIFIED TYPE: ICD-10-CM

## 2024-03-26 PROCEDURE — 93010 ELECTROCARDIOGRAM REPORT: CPT | Mod: S$PBB,,, | Performed by: INTERNAL MEDICINE

## 2024-03-26 PROCEDURE — 93005 ELECTROCARDIOGRAM TRACING: CPT | Mod: PBBFAC,PN | Performed by: INTERNAL MEDICINE

## 2024-03-26 PROCEDURE — 99213 OFFICE O/P EST LOW 20 MIN: CPT | Mod: PBBFAC,PN,25 | Performed by: NURSE PRACTITIONER

## 2024-03-26 PROCEDURE — 99215 OFFICE O/P EST HI 40 MIN: CPT | Mod: S$PBB,,, | Performed by: NURSE PRACTITIONER

## 2024-03-26 PROCEDURE — 99999 PR PBB SHADOW E&M-EST. PATIENT-LVL III: CPT | Mod: PBBFAC,,, | Performed by: NURSE PRACTITIONER

## 2024-03-26 RX ORDER — DIPHENHYDRAMINE HCL 50 MG
50 CAPSULE ORAL
Status: CANCELLED | OUTPATIENT
Start: 2024-03-26

## 2024-03-26 RX ORDER — SODIUM CHLORIDE 9 MG/ML
INJECTION, SOLUTION INTRAVENOUS ONCE
Status: CANCELLED | OUTPATIENT
Start: 2024-03-26 | End: 2024-03-26

## 2024-03-26 RX ORDER — ISOSORBIDE MONONITRATE 30 MG/1
30 TABLET, EXTENDED RELEASE ORAL DAILY
Qty: 30 TABLET | Refills: 11 | Status: SHIPPED | OUTPATIENT
Start: 2024-03-26 | End: 2025-03-26

## 2024-03-26 NOTE — H&P (VIEW-ONLY)
Subjective:    Patient ID:  Pelon Lazo is a 73 y.o. male who presents for follow-up   Chief Complaint   Patient presents with    Hypertension    Coronary Artery Disease       HPI:    Mr. Lazo is a 73 year old male patient with a PMH significant for CAD S/P CABG and PCI last in 2019 of RCA. EKG done today shows inferior infarct and st t wave abnormality in lateral leads. This is a change. He has more fatigue and tiredness. Gets SOB with exertion. Is a severe Diabetic and is obese.      CATH 2019    Mid RCA lesion , 95% stenosed reduced to 0%..  A STENT KALIE SIDNEY 3.0 X 26 stent was successfully placed at 12 TRACIE for 20 sec.  Three vessel coronary artery disease.  Prox LAD lesion , 50% stenosed, Lesion is distal to LIMA distal anastamosis.  LVEDP (Pre): 14  LV angio not done  LIMA to LAD and SVG to OMB are Patent    Review of patient's allergies indicates:  No Known Allergies    History reviewed. No pertinent past medical history.  Past Surgical History:   Procedure Laterality Date    ANGIOGRAM, CORONARY, WITH LEFT HEART CATHETERIZATION Left 12/8/2019    Procedure: Left heart cath w/LV;  Surgeon: Kaiden Camargo MD;  Location: [facility] CATH/EP LAB;  Service: Cardiology;  Laterality: Left;     Social History     Tobacco Use    Smoking status: Never    Smokeless tobacco: Never     History reviewed. No pertinent family history.     Review of Systems:   Constitution: Negative for diaphoresis and fever.   HEENT: Negative for nosebleeds.    Cardiovascular: Negative for chest pain       + dyspnea on exertion       No leg swelling        No palpitations  Respiratory: Negative for shortness of breath and wheezing.    Hematologic/Lymphatic: Negative for bleeding problem. Does not bruise/bleed easily.   Skin: Negative for color change and rash.   Musculoskeletal: Negative for falls and myalgias.   Gastrointestinal: Negative for hematemesis and hematochezia.   Genitourinary: Negative for hematuria.   Neurological: Negative  for dizziness and light-headedness.   Psychiatric/Behavioral: Negative for altered mental status and memory loss.          Objective:        Vitals:    03/26/24 1442   BP: (!) 142/73   Pulse: 95       Lab Results   Component Value Date    WBC 9.96 12/10/2019    HGB 7.8 (L) 08/12/2022    HCT 23.4 (L) 08/12/2022     12/10/2019    CHOL 135 12/07/2019    TRIG 205 (H) 12/07/2019    HDL 22 (L) 12/07/2019    ALT 22 12/06/2019    AST 18 12/06/2019     12/10/2019    K 4.0 12/10/2019     12/10/2019    CREATININE 0.9 12/10/2019    BUN 19 12/10/2019    CO2 26 12/10/2019    TSH 2.580 12/06/2019    INR 1.0 12/06/2019    HGBA1C 8.4 (A) 07/14/2022        ECHOCARDIOGRAM RESULTS  Results for orders placed during the hospital encounter of 12/06/19    Echo Color Flow Doppler? Yes    Interpretation Summary  · Moderate concentric left ventricular hypertrophy.  · Normal left ventricular systolic function. The estimated ejection fraction is 60%  · Grade I (mild) left ventricular diastolic dysfunction consistent with impaired relaxation.  · Normal right ventricular systolic function.  · Mild left atrial enlargement.        CURRENT/PREVIOUS VISIT EKG  Results for orders placed or performed in visit on 05/31/21   IN OFFICE EKG 12-LEAD (to [place])    Collection Time: 05/31/21  2:29 PM    Narrative    Test Reason : E78.5,    Vent. Rate : 075 BPM     Atrial Rate : 075 BPM     P-R Int : 178 ms          QRS Dur : 102 ms      QT Int : 432 ms       P-R-T Axes : 041 003 095 degrees     QTc Int : 482 ms    Normal sinus rhythm  Abnormal QRS-T angle, consider primary T wave abnormality  Prolonged QT  Abnormal ECG  When compared with ECG of 09-DEC-2019 07:18,  No significant change was found  Confirmed by Fred PECK, Osvaldo PHELPS ([id number]) on 6/21/2021 8:52:07 AM    Referred By:  WATSNO           Confirmed By:Osvaldo Ibarra MD     No valid procedures specified.   Results for orders placed during the hospital encounter of 12/06/19    Treadmill  Stress Test    Interpretation Summary    The EKG portion of this study is positive for ischemia.    The patient reported no chest pain during the stress test.    ECG Stress Nuclear portion of this study will be reported separately.      Physical Exam:  CONSTITUTIONAL: No fever, no chills  HEENT: Normocephalic, atraumatic,pupils reactive to light                 NECK:  No JVD no carotid bruit  CVS: S1S2+, RRR, no murmurs,   LUNGS: Clear  ABDOMEN: Soft, NT, BS+  EXTREMITIES: No cyanosis, edema  : No blood catheter  NEURO: AAO X 3  PSY: Normal affect      Medication List with Changes/Refills   New Medications    ISOSORBIDE MONONITRATE (IMDUR) 30 MG 24 HR TABLET    Take 1 tablet (30 mg total) by mouth once daily.    TIRZEPATIDE 2.5 MG/0.5 ML PNIJ    Inject 2.5 mg into the skin every 7 days.   Current Medications    ASPIRIN (ECOTRIN) 325 MG EC TABLET    TAKE 1 TABLET BY MOUTH EVERY DAY    DOCUSATE SODIUM (COLACE) 100 MG CAPSULE    Take 100 mg by mouth daily as needed.    DOXEPIN (SILENOR) 6 MG TAB    Take 6 mg by mouth nightly as needed.    ERGOCALCIFEROL (ERGOCALCIFEROL) 50,000 UNIT CAP    Take 50,000 Units by mouth every 7 days.    FERROUS SULFATE (FEOSOL) 325 MG (65 MG IRON) TAB TABLET    Take 325 mg by mouth once daily.    GLIMEPIRIDE (AMARYL) 4 MG TABLET    Take 4 mg by mouth 2 (two) times daily.    HYDROCODONE-ACETAMINOPHEN (NORCO) 7.5-325 MG PER TABLET    Take 1 tablet by mouth every 12 (twelve) hours as needed for Pain.    INSULIN GLARGINE 100 UNITS/ML (3ML) SUBQ PEN    Inject 50 Units into the skin every evening.     METOPROLOL TARTRATE (LOPRESSOR) 25 MG TABLET    Take 0.5 tablets (12.5 mg total) by mouth 2 (two) times daily.    MUPIROCIN (BACTROBAN) 2 % OINTMENT    Apply topically 3 (three) times daily.    NIACIN 500 MG TAB    Take 500 mg by mouth once daily.    OMEPRAZOLE 20 MG TBEC    Take 20 mg by mouth once daily.    PRAVASTATIN (PRAVACHOL) 20 MG TABLET    Take 20 mg by mouth once daily.    TRAZODONE  (DESYREL) 50 MG TABLET    Take 50 mg by mouth every evening. Patient states he takes 4mg.             Assessment:       1. Diabetes mellitus without complication    2. Morbid obesity    3. S/P CABG (coronary artery bypass graft)    4. Dyslipidemia    5. Diabetes mellitus due to underlying condition with hyperosmolarity without coma, without long-term current use of insulin    6. Venous insufficiency    7. Atherosclerosis of native coronary artery of native heart with unstable angina pectoris         Plan:       PLAN FOR [facility]  Spoke to Dr. ZACHARIAH Mckeon and showed him his EKG    Discussed with patient the benefits and options at length in detail.  Coronary Angiogram +/- PCI  AntiPlatelet therapy-  Asprin Plavix Brilinta  Access - Bilateral CFA/ Radial  Allergies : No Iodine Allergy  Pre Hydration IVF  cc /hr  Pre Procedure Medication : Benadryl 25 - 50 mg po prior to procedure X 1  No recent head trauma.  No bleeding issues or Blood in the stools or Urine.   Risks benefits and alternate options were explained to the Patient.    Risks include but not limited to bleeding, allergic reaction to the dye, vascular damage, renal failure with potential need for Dialysis, Infection, Rhythm abnormalities, stroke, myocardial infarction, emergency bypass surgery and death.    The risks is of moderate sedation include hypotension respiratory depression arrhythmias bronchospasm and death.  Patient does understand these risks and wants to proceed with cardiac catheterization.  Should stenting be indicated, patient has agreed to dual antiplatelet therapy for 12 to 18 months with drug-eluting stent and a minimum of 1 month of dual antiplatelet therapy with the use of bare metal stent.  Additionally patient is aware of the noncompliance with medications is likely to result in the stent clotting with heart attack and heart failure and or death.  All questions have been answered to patient's satisfaction.And patient has voiced to  proceed with the procedure.  Verbal consent has been obtained and patient is agreeable to proceed with the procedure.         - add imdur 30 mg daily  -Increase Metoprolol to whole tab BID  -Monjouro for DM and weight loss      F/U After the above    IF CP OCCURS AND NITRO PRN NEEDED AFTER 2 SL CALL 911    Problem List Items Addressed This Visit          Cardiac/Vascular    S/P CABG (coronary artery bypass graft) (Chronic)    Venous insufficiency (Chronic)    Atherosclerosis of native coronary artery of native heart with unstable angina pectoris    Dyslipidemia       Endocrine    Morbid obesity (Chronic)    Relevant Medications    tirzepatide 2.5 mg/0.5 mL PnIj    Other Relevant Orders    IN OFFICE EKG 12-LEAD (to [facility])    Diabetes mellitus without complication - Primary    Relevant Medications    tirzepatide 2.5 mg/0.5 mL PnIj    Other Relevant Orders    IN OFFICE EKG 12-LEAD (to [facility])    Diabetes mellitus due to underlying condition with hyperosmolarity without coma, without long-term current use of insulin       No follow-ups on file.       Carmen Baxter, ROLF-ACNP, CVNP-BC

## 2024-03-26 NOTE — PROGRESS NOTES
Subjective:    Patient ID:  Pelon Lazo is a 73 y.o. male who presents for follow-up   Chief Complaint   Patient presents with    Hypertension    Coronary Artery Disease       HPI:    Mr. Lazo is a 73 year old male patient with a PMH significant for CAD S/P CABG and PCI last in 2019 of RCA. EKG done today shows inferior infarct and st t wave abnormality in lateral leads. This is a change. He has more fatigue and tiredness. Gets SOB with exertion. Is a severe Diabetic and is obese.      CATH 2019    Mid RCA lesion , 95% stenosed reduced to 0%..  A STENT KALIE SIDNEY 3.0 X 26 stent was successfully placed at 12 TRACIE for 20 sec.  Three vessel coronary artery disease.  Prox LAD lesion , 50% stenosed, Lesion is distal to LIMA distal anastamosis.  LVEDP (Pre): 14  LV angio not done  LIMA to LAD and SVG to OMB are Patent    Review of patient's allergies indicates:  No Known Allergies    History reviewed. No pertinent past medical history.  Past Surgical History:   Procedure Laterality Date    ANGIOGRAM, CORONARY, WITH LEFT HEART CATHETERIZATION Left 12/8/2019    Procedure: Left heart cath w/LV;  Surgeon: Kaiden Camargo MD;  Location: [facility] CATH/EP LAB;  Service: Cardiology;  Laterality: Left;     Social History     Tobacco Use    Smoking status: Never    Smokeless tobacco: Never     History reviewed. No pertinent family history.     Review of Systems:   Constitution: Negative for diaphoresis and fever.   HEENT: Negative for nosebleeds.    Cardiovascular: Negative for chest pain       + dyspnea on exertion       No leg swelling        No palpitations  Respiratory: Negative for shortness of breath and wheezing.    Hematologic/Lymphatic: Negative for bleeding problem. Does not bruise/bleed easily.   Skin: Negative for color change and rash.   Musculoskeletal: Negative for falls and myalgias.   Gastrointestinal: Negative for hematemesis and hematochezia.   Genitourinary: Negative for hematuria.   Neurological: Negative  for dizziness and light-headedness.   Psychiatric/Behavioral: Negative for altered mental status and memory loss.          Objective:        Vitals:    03/26/24 1442   BP: (!) 142/73   Pulse: 95       Lab Results   Component Value Date    WBC 9.96 12/10/2019    HGB 7.8 (L) 08/12/2022    HCT 23.4 (L) 08/12/2022     12/10/2019    CHOL 135 12/07/2019    TRIG 205 (H) 12/07/2019    HDL 22 (L) 12/07/2019    ALT 22 12/06/2019    AST 18 12/06/2019     12/10/2019    K 4.0 12/10/2019     12/10/2019    CREATININE 0.9 12/10/2019    BUN 19 12/10/2019    CO2 26 12/10/2019    TSH 2.580 12/06/2019    INR 1.0 12/06/2019    HGBA1C 8.4 (A) 07/14/2022        ECHOCARDIOGRAM RESULTS  Results for orders placed during the hospital encounter of 12/06/19    Echo Color Flow Doppler? Yes    Interpretation Summary  · Moderate concentric left ventricular hypertrophy.  · Normal left ventricular systolic function. The estimated ejection fraction is 60%  · Grade I (mild) left ventricular diastolic dysfunction consistent with impaired relaxation.  · Normal right ventricular systolic function.  · Mild left atrial enlargement.        CURRENT/PREVIOUS VISIT EKG  Results for orders placed or performed in visit on 05/31/21   IN OFFICE EKG 12-LEAD (to [place])    Collection Time: 05/31/21  2:29 PM    Narrative    Test Reason : E78.5,    Vent. Rate : 075 BPM     Atrial Rate : 075 BPM     P-R Int : 178 ms          QRS Dur : 102 ms      QT Int : 432 ms       P-R-T Axes : 041 003 095 degrees     QTc Int : 482 ms    Normal sinus rhythm  Abnormal QRS-T angle, consider primary T wave abnormality  Prolonged QT  Abnormal ECG  When compared with ECG of 09-DEC-2019 07:18,  No significant change was found  Confirmed by Fred PECK, Osvaldo PHELPS ([id number]) on 6/21/2021 8:52:07 AM    Referred By:  WATSON           Confirmed By:Osvaldo Ibarra MD     No valid procedures specified.   Results for orders placed during the hospital encounter of 12/06/19    Treadmill  Stress Test    Interpretation Summary    The EKG portion of this study is positive for ischemia.    The patient reported no chest pain during the stress test.    ECG Stress Nuclear portion of this study will be reported separately.      Physical Exam:  CONSTITUTIONAL: No fever, no chills  HEENT: Normocephalic, atraumatic,pupils reactive to light                 NECK:  No JVD no carotid bruit  CVS: S1S2+, RRR, no murmurs,   LUNGS: Clear  ABDOMEN: Soft, NT, BS+  EXTREMITIES: No cyanosis, edema  : No blood catheter  NEURO: AAO X 3  PSY: Normal affect      Medication List with Changes/Refills   New Medications    ISOSORBIDE MONONITRATE (IMDUR) 30 MG 24 HR TABLET    Take 1 tablet (30 mg total) by mouth once daily.    TIRZEPATIDE 2.5 MG/0.5 ML PNIJ    Inject 2.5 mg into the skin every 7 days.   Current Medications    ASPIRIN (ECOTRIN) 325 MG EC TABLET    TAKE 1 TABLET BY MOUTH EVERY DAY    DOCUSATE SODIUM (COLACE) 100 MG CAPSULE    Take 100 mg by mouth daily as needed.    DOXEPIN (SILENOR) 6 MG TAB    Take 6 mg by mouth nightly as needed.    ERGOCALCIFEROL (ERGOCALCIFEROL) 50,000 UNIT CAP    Take 50,000 Units by mouth every 7 days.    FERROUS SULFATE (FEOSOL) 325 MG (65 MG IRON) TAB TABLET    Take 325 mg by mouth once daily.    GLIMEPIRIDE (AMARYL) 4 MG TABLET    Take 4 mg by mouth 2 (two) times daily.    HYDROCODONE-ACETAMINOPHEN (NORCO) 7.5-325 MG PER TABLET    Take 1 tablet by mouth every 12 (twelve) hours as needed for Pain.    INSULIN GLARGINE 100 UNITS/ML (3ML) SUBQ PEN    Inject 50 Units into the skin every evening.     METOPROLOL TARTRATE (LOPRESSOR) 25 MG TABLET    Take 0.5 tablets (12.5 mg total) by mouth 2 (two) times daily.    MUPIROCIN (BACTROBAN) 2 % OINTMENT    Apply topically 3 (three) times daily.    NIACIN 500 MG TAB    Take 500 mg by mouth once daily.    OMEPRAZOLE 20 MG TBEC    Take 20 mg by mouth once daily.    PRAVASTATIN (PRAVACHOL) 20 MG TABLET    Take 20 mg by mouth once daily.    TRAZODONE  (DESYREL) 50 MG TABLET    Take 50 mg by mouth every evening. Patient states he takes 4mg.             Assessment:       1. Diabetes mellitus without complication    2. Morbid obesity    3. S/P CABG (coronary artery bypass graft)    4. Dyslipidemia    5. Diabetes mellitus due to underlying condition with hyperosmolarity without coma, without long-term current use of insulin    6. Venous insufficiency    7. Atherosclerosis of native coronary artery of native heart with unstable angina pectoris         Plan:       PLAN FOR [facility]  Spoke to Dr. ZACHARIAH Mckeon and showed him his EKG    Discussed with patient the benefits and options at length in detail.  Coronary Angiogram +/- PCI  AntiPlatelet therapy-  Asprin Plavix Brilinta  Access - Bilateral CFA/ Radial  Allergies : No Iodine Allergy  Pre Hydration IVF  cc /hr  Pre Procedure Medication : Benadryl 25 - 50 mg po prior to procedure X 1  No recent head trauma.  No bleeding issues or Blood in the stools or Urine.   Risks benefits and alternate options were explained to the Patient.    Risks include but not limited to bleeding, allergic reaction to the dye, vascular damage, renal failure with potential need for Dialysis, Infection, Rhythm abnormalities, stroke, myocardial infarction, emergency bypass surgery and death.    The risks is of moderate sedation include hypotension respiratory depression arrhythmias bronchospasm and death.  Patient does understand these risks and wants to proceed with cardiac catheterization.  Should stenting be indicated, patient has agreed to dual antiplatelet therapy for 12 to 18 months with drug-eluting stent and a minimum of 1 month of dual antiplatelet therapy with the use of bare metal stent.  Additionally patient is aware of the noncompliance with medications is likely to result in the stent clotting with heart attack and heart failure and or death.  All questions have been answered to patient's satisfaction.And patient has voiced to  proceed with the procedure.  Verbal consent has been obtained and patient is agreeable to proceed with the procedure.         - add imdur 30 mg daily  -Increase Metoprolol to whole tab BID  -Monjouro for DM and weight loss      F/U After the above    IF CP OCCURS AND NITRO PRN NEEDED AFTER 2 SL CALL 911    Problem List Items Addressed This Visit          Cardiac/Vascular    S/P CABG (coronary artery bypass graft) (Chronic)    Venous insufficiency (Chronic)    Atherosclerosis of native coronary artery of native heart with unstable angina pectoris    Dyslipidemia       Endocrine    Morbid obesity (Chronic)    Relevant Medications    tirzepatide 2.5 mg/0.5 mL PnIj    Other Relevant Orders    IN OFFICE EKG 12-LEAD (to [facility])    Diabetes mellitus without complication - Primary    Relevant Medications    tirzepatide 2.5 mg/0.5 mL PnIj    Other Relevant Orders    IN OFFICE EKG 12-LEAD (to [facility])    Diabetes mellitus due to underlying condition with hyperosmolarity without coma, without long-term current use of insulin       No follow-ups on file.       Carmen Baxter, ROLF-ACNP, CVNP-BC

## 2024-04-06 DIAGNOSIS — I25.10 CORONARY ARTERY DISEASE INVOLVING NATIVE CORONARY ARTERY OF NATIVE HEART WITHOUT ANGINA PECTORIS: ICD-10-CM

## 2024-04-08 RX ORDER — ASPIRIN 325 MG
325 TABLET, DELAYED RELEASE (ENTERIC COATED) ORAL
Qty: 90 TABLET | Refills: 0 | Status: ON HOLD | OUTPATIENT
Start: 2024-04-08 | End: 2024-04-23 | Stop reason: HOSPADM

## 2024-04-22 ENCOUNTER — TELEPHONE (OUTPATIENT)
Dept: CARDIOLOGY | Facility: CLINIC | Age: 74
End: 2024-04-22
Payer: MEDICARE

## 2024-04-22 NOTE — TELEPHONE ENCOUNTER
S/w pt & advised procedure is tomorrow at 1030, to get there about 830. He has stopped diabetic medication.

## 2024-04-22 NOTE — TELEPHONE ENCOUNTER
----- Message from Radha Mathur sent at 4/22/2024  9:22 AM CDT -----  Regarding: Needs Medical Advice-for tomorrow's procedure  Contact: patient at [phone]  Type: Needs Medical Advice  Who Called:  patient at [phone]    Additional Information: needs details for tomorrow's procedure. Please call and advise. Thank you

## 2024-04-23 ENCOUNTER — HOSPITAL ENCOUNTER (OUTPATIENT)
Facility: HOSPITAL | Age: 74
Discharge: HOME | End: 2024-04-23
Attending: STUDENT IN AN ORGANIZED HEALTH CARE EDUCATION/TRAINING PROGRAM | Admitting: STUDENT IN AN ORGANIZED HEALTH CARE EDUCATION/TRAINING PROGRAM
Payer: MEDICARE

## 2024-04-23 VITALS
SYSTOLIC BLOOD PRESSURE: 143 MMHG | DIASTOLIC BLOOD PRESSURE: 66 MMHG | RESPIRATION RATE: 15 BRPM | OXYGEN SATURATION: 98 % | HEART RATE: 77 BPM

## 2024-04-23 DIAGNOSIS — I25.10 CORONARY ARTERY DISEASE INVOLVING NATIVE CORONARY ARTERY OF NATIVE HEART, UNSPECIFIED WHETHER ANGINA PRESENT: ICD-10-CM

## 2024-04-23 DIAGNOSIS — R06.09 DOE (DYSPNEA ON EXERTION): ICD-10-CM

## 2024-04-23 DIAGNOSIS — I25.10 CAD (CORONARY ARTERY DISEASE): ICD-10-CM

## 2024-04-23 DIAGNOSIS — R94.31 NONSPECIFIC ABNORMAL ELECTROCARDIOGRAM (ECG) (EKG): ICD-10-CM

## 2024-04-23 DIAGNOSIS — R07.89 OTHER CHEST PAIN: ICD-10-CM

## 2024-04-23 DIAGNOSIS — R53.83 FATIGUE, UNSPECIFIED TYPE: ICD-10-CM

## 2024-04-23 LAB
ANION GAP SERPL CALC-SCNC: 6 MMOL/L (ref 8–16)
BASOPHILS # BLD AUTO: 0.05 K/UL (ref 0–0.2)
BASOPHILS NFR BLD: 0.6 % (ref 0–1.9)
BUN SERPL-MCNC: 15 MG/DL (ref 8–23)
CALCIUM SERPL-MCNC: 9.1 MG/DL (ref 8.7–10.5)
CHLORIDE SERPL-SCNC: 100 MMOL/L (ref 95–110)
CO2 SERPL-SCNC: 27 MMOL/L (ref 23–29)
CREAT SERPL-MCNC: 0.8 MG/DL (ref 0.5–1.4)
DIFFERENTIAL METHOD BLD: ABNORMAL
EOSINOPHIL # BLD AUTO: 0.2 K/UL (ref 0–0.5)
EOSINOPHIL NFR BLD: 2 % (ref 0–8)
ERYTHROCYTE [DISTWIDTH] IN BLOOD BY AUTOMATED COUNT: 12.8 % (ref 11.5–14.5)
EST. GFR  (NO RACE VARIABLE): >60 ML/MIN/1.73 M^2
GLUCOSE SERPL-MCNC: 323 MG/DL (ref 70–110)
HCT VFR BLD AUTO: 44.4 % (ref 40–54)
HGB BLD-MCNC: 14.9 G/DL (ref 14–18)
IMM GRANULOCYTES # BLD AUTO: 0.03 K/UL (ref 0–0.04)
IMM GRANULOCYTES NFR BLD AUTO: 0.4 % (ref 0–0.5)
LYMPHOCYTES # BLD AUTO: 1.6 K/UL (ref 1–4.8)
LYMPHOCYTES NFR BLD: 18.5 % (ref 18–48)
MCH RBC QN AUTO: 31.3 PG (ref 27–31)
MCHC RBC AUTO-ENTMCNC: 33.6 G/DL (ref 32–36)
MCV RBC AUTO: 93 FL (ref 82–98)
MONOCYTES # BLD AUTO: 0.8 K/UL (ref 0.3–1)
MONOCYTES NFR BLD: 9.5 % (ref 4–15)
NEUTROPHILS # BLD AUTO: 5.8 K/UL (ref 1.8–7.7)
NEUTROPHILS NFR BLD: 69 % (ref 38–73)
NRBC BLD-RTO: 0 /100 WBC
PLATELET # BLD AUTO: 196 K/UL (ref 150–450)
PMV BLD AUTO: 10.1 FL (ref 9.2–12.9)
POC ACTIVATED CLOTTING TIME K: 217 SEC (ref 74–137)
POC ACTIVATED CLOTTING TIME K: 222 SEC (ref 74–137)
POC ACTIVATED CLOTTING TIME K: 271 SEC (ref 74–137)
POTASSIUM SERPL-SCNC: 4.3 MMOL/L (ref 3.5–5.1)
RBC # BLD AUTO: 4.76 M/UL (ref 4.6–6.2)
SAMPLE: ABNORMAL
SODIUM SERPL-SCNC: 133 MMOL/L (ref 136–145)
WBC # BLD AUTO: 8.41 K/UL (ref 3.9–12.7)

## 2024-04-23 PROCEDURE — 25000003 PHARM REV CODE 250: Performed by: NURSE PRACTITIONER

## 2024-04-23 PROCEDURE — 96374 THER/PROPH/DIAG INJ IV PUSH: CPT | Mod: 59 | Performed by: STUDENT IN AN ORGANIZED HEALTH CARE EDUCATION/TRAINING PROGRAM

## 2024-04-23 PROCEDURE — 25000003 PHARM REV CODE 250

## 2024-04-23 PROCEDURE — 92978 ENDOLUMINL IVUS OCT C 1ST: CPT | Mod: 26,,, | Performed by: STUDENT IN AN ORGANIZED HEALTH CARE EDUCATION/TRAINING PROGRAM

## 2024-04-23 PROCEDURE — 99152 MOD SED SAME PHYS/QHP 5/>YRS: CPT | Mod: ,,, | Performed by: STUDENT IN AN ORGANIZED HEALTH CARE EDUCATION/TRAINING PROGRAM

## 2024-04-23 PROCEDURE — 99152 MOD SED SAME PHYS/QHP 5/>YRS: CPT | Performed by: STUDENT IN AN ORGANIZED HEALTH CARE EDUCATION/TRAINING PROGRAM

## 2024-04-23 PROCEDURE — 93459 L HRT ART/GRFT ANGIO: CPT | Mod: 59 | Performed by: STUDENT IN AN ORGANIZED HEALTH CARE EDUCATION/TRAINING PROGRAM

## 2024-04-23 PROCEDURE — 93005 ELECTROCARDIOGRAM TRACING: CPT | Performed by: INTERNAL MEDICINE

## 2024-04-23 PROCEDURE — 93005 ELECTROCARDIOGRAM TRACING: CPT | Performed by: GENERAL PRACTICE

## 2024-04-23 PROCEDURE — C9600 PERC DRUG-EL COR STENT SING: HCPCS | Mod: RC | Performed by: STUDENT IN AN ORGANIZED HEALTH CARE EDUCATION/TRAINING PROGRAM

## 2024-04-23 PROCEDURE — 92928 PRQ TCAT PLMT NTRAC ST 1 LES: CPT | Mod: RC,,, | Performed by: STUDENT IN AN ORGANIZED HEALTH CARE EDUCATION/TRAINING PROGRAM

## 2024-04-23 PROCEDURE — C1874 STENT, COATED/COV W/DEL SYS: HCPCS | Performed by: STUDENT IN AN ORGANIZED HEALTH CARE EDUCATION/TRAINING PROGRAM

## 2024-04-23 PROCEDURE — C1760 CLOSURE DEV, VASC: HCPCS | Performed by: STUDENT IN AN ORGANIZED HEALTH CARE EDUCATION/TRAINING PROGRAM

## 2024-04-23 PROCEDURE — 63600175 PHARM REV CODE 636 W HCPCS: Performed by: STUDENT IN AN ORGANIZED HEALTH CARE EDUCATION/TRAINING PROGRAM

## 2024-04-23 PROCEDURE — 63600175 PHARM REV CODE 636 W HCPCS

## 2024-04-23 PROCEDURE — 25500020 PHARM REV CODE 255: Performed by: STUDENT IN AN ORGANIZED HEALTH CARE EDUCATION/TRAINING PROGRAM

## 2024-04-23 PROCEDURE — 93010 ELECTROCARDIOGRAM REPORT: CPT | Mod: ,,, | Performed by: INTERNAL MEDICINE

## 2024-04-23 PROCEDURE — 92978 ENDOLUMINL IVUS OCT C 1ST: CPT | Performed by: STUDENT IN AN ORGANIZED HEALTH CARE EDUCATION/TRAINING PROGRAM

## 2024-04-23 PROCEDURE — 25000003 PHARM REV CODE 250: Performed by: STUDENT IN AN ORGANIZED HEALTH CARE EDUCATION/TRAINING PROGRAM

## 2024-04-23 PROCEDURE — 27201423 OPTIME MED/SURG SUP & DEVICES STERILE SUPPLY: Performed by: STUDENT IN AN ORGANIZED HEALTH CARE EDUCATION/TRAINING PROGRAM

## 2024-04-23 PROCEDURE — 85025 COMPLETE CBC W/AUTO DIFF WBC: CPT | Performed by: NURSE PRACTITIONER

## 2024-04-23 PROCEDURE — 80048 BASIC METABOLIC PNL TOTAL CA: CPT | Performed by: NURSE PRACTITIONER

## 2024-04-23 PROCEDURE — C1725 CATH, TRANSLUMIN NON-LASER: HCPCS | Performed by: STUDENT IN AN ORGANIZED HEALTH CARE EDUCATION/TRAINING PROGRAM

## 2024-04-23 PROCEDURE — C1769 GUIDE WIRE: HCPCS | Performed by: STUDENT IN AN ORGANIZED HEALTH CARE EDUCATION/TRAINING PROGRAM

## 2024-04-23 PROCEDURE — 93459 L HRT ART/GRFT ANGIO: CPT | Mod: 26,59,51, | Performed by: STUDENT IN AN ORGANIZED HEALTH CARE EDUCATION/TRAINING PROGRAM

## 2024-04-23 PROCEDURE — 99153 MOD SED SAME PHYS/QHP EA: CPT | Performed by: STUDENT IN AN ORGANIZED HEALTH CARE EDUCATION/TRAINING PROGRAM

## 2024-04-23 PROCEDURE — C1753 CATH, INTRAVAS ULTRASOUND: HCPCS | Performed by: STUDENT IN AN ORGANIZED HEALTH CARE EDUCATION/TRAINING PROGRAM

## 2024-04-23 PROCEDURE — 93010 ELECTROCARDIOGRAM REPORT: CPT | Mod: ,,, | Performed by: GENERAL PRACTICE

## 2024-04-23 PROCEDURE — C1894 INTRO/SHEATH, NON-LASER: HCPCS | Performed by: STUDENT IN AN ORGANIZED HEALTH CARE EDUCATION/TRAINING PROGRAM

## 2024-04-23 DEVICE — STENT ONYXNG20022UX ONYX 2.00X22RX
Type: IMPLANTABLE DEVICE | Site: CORONARY | Status: FUNCTIONAL
Brand: ONYX FRONTIER™

## 2024-04-23 DEVICE — STENT ONYXNG30038UX ONYX 3.00X38RX
Type: IMPLANTABLE DEVICE | Site: CORONARY | Status: FUNCTIONAL
Brand: ONYX FRONTIER™

## 2024-04-23 DEVICE — STENT ONYXNG30034UX ONYX 3.00X34RX
Type: IMPLANTABLE DEVICE | Site: CORONARY | Status: FUNCTIONAL
Brand: ONYX FRONTIER™

## 2024-04-23 DEVICE — ANGIO-SEAL VIP VASCULAR CLOSURE DEVICE
Type: IMPLANTABLE DEVICE | Site: GROIN | Status: FUNCTIONAL
Brand: ANGIO-SEAL

## 2024-04-23 RX ORDER — DIPHENHYDRAMINE HCL 25 MG
CAPSULE ORAL
Status: COMPLETED
Start: 2024-04-23 | End: 2024-04-23

## 2024-04-23 RX ORDER — HEPARIN SODIUM 10000 [USP'U]/ML
INJECTION, SOLUTION INTRAVENOUS; SUBCUTANEOUS
Status: DISCONTINUED | OUTPATIENT
Start: 2024-04-23 | End: 2024-04-23 | Stop reason: HOSPADM

## 2024-04-23 RX ORDER — SODIUM CHLORIDE 9 MG/ML
INJECTION, SOLUTION INTRAVENOUS ONCE
Status: COMPLETED | OUTPATIENT
Start: 2024-04-23 | End: 2024-04-23

## 2024-04-23 RX ORDER — HYDROCODONE BITARTRATE AND ACETAMINOPHEN 7.5; 325 MG/1; MG/1
1 TABLET ORAL ONCE
Status: COMPLETED | OUTPATIENT
Start: 2024-04-23 | End: 2024-04-23

## 2024-04-23 RX ORDER — NAPROXEN SODIUM 220 MG/1
TABLET, FILM COATED ORAL
Status: COMPLETED
Start: 2024-04-23 | End: 2024-04-23

## 2024-04-23 RX ORDER — NAPROXEN SODIUM 220 MG/1
81 TABLET, FILM COATED ORAL ONCE
Qty: 1 TABLET | Refills: 0 | Status: COMPLETED | OUTPATIENT
Start: 2024-04-23 | End: 2024-04-23

## 2024-04-23 RX ORDER — ASPIRIN 81 MG/1
81 TABLET ORAL DAILY
Qty: 30 TABLET | Refills: 11 | Status: SHIPPED | OUTPATIENT
Start: 2024-04-23 | End: 2024-05-08 | Stop reason: SDUPTHER

## 2024-04-23 RX ORDER — CLOPIDOGREL BISULFATE 75 MG/1
75 TABLET ORAL DAILY
Qty: 90 TABLET | Refills: 3 | Status: SHIPPED | OUTPATIENT
Start: 2024-04-23 | End: 2025-04-23

## 2024-04-23 RX ORDER — LIDOCAINE HYDROCHLORIDE 10 MG/ML
INJECTION, SOLUTION EPIDURAL; INFILTRATION; INTRACAUDAL; PERINEURAL
Status: DISCONTINUED | OUTPATIENT
Start: 2024-04-23 | End: 2024-04-23 | Stop reason: HOSPADM

## 2024-04-23 RX ORDER — ONDANSETRON HYDROCHLORIDE 2 MG/ML
8 INJECTION, SOLUTION INTRAVENOUS ONCE
Status: COMPLETED | OUTPATIENT
Start: 2024-04-23 | End: 2024-04-23

## 2024-04-23 RX ORDER — DIPHENHYDRAMINE HCL 25 MG
50 CAPSULE ORAL
Status: DISCONTINUED | OUTPATIENT
Start: 2024-04-23 | End: 2024-04-23 | Stop reason: HOSPADM

## 2024-04-23 RX ORDER — NITROGLYCERIN 5 MG/ML
INJECTION, SOLUTION INTRAVENOUS
Status: DISCONTINUED | OUTPATIENT
Start: 2024-04-23 | End: 2024-04-23 | Stop reason: HOSPADM

## 2024-04-23 RX ORDER — FENTANYL CITRATE 50 UG/ML
INJECTION, SOLUTION INTRAMUSCULAR; INTRAVENOUS
Status: DISCONTINUED | OUTPATIENT
Start: 2024-04-23 | End: 2024-04-23 | Stop reason: HOSPADM

## 2024-04-23 RX ORDER — MIDAZOLAM HYDROCHLORIDE 2 MG/2ML
INJECTION, SOLUTION INTRAMUSCULAR; INTRAVENOUS
Status: DISCONTINUED | OUTPATIENT
Start: 2024-04-23 | End: 2024-04-23 | Stop reason: HOSPADM

## 2024-04-23 RX ORDER — SODIUM CHLORIDE 9 MG/ML
INJECTION, SOLUTION INTRAVENOUS CONTINUOUS
Status: DISCONTINUED | OUTPATIENT
Start: 2024-04-23 | End: 2024-04-23 | Stop reason: HOSPADM

## 2024-04-23 RX ORDER — ONDANSETRON HYDROCHLORIDE 2 MG/ML
INJECTION, SOLUTION INTRAVENOUS
Status: COMPLETED
Start: 2024-04-23 | End: 2024-04-23

## 2024-04-23 RX ORDER — CLOPIDOGREL BISULFATE 75 MG/1
TABLET ORAL
Status: DISCONTINUED | OUTPATIENT
Start: 2024-04-23 | End: 2024-04-23 | Stop reason: HOSPADM

## 2024-04-23 RX ORDER — HYDROCODONE BITARTRATE AND ACETAMINOPHEN 7.5; 325 MG/1; MG/1
TABLET ORAL
Status: DISCONTINUED
Start: 2024-04-23 | End: 2024-04-23 | Stop reason: HOSPADM

## 2024-04-23 RX ORDER — CLOPIDOGREL BISULFATE 75 MG/1
75 TABLET ORAL DAILY
Qty: 30 TABLET | Refills: 0 | Status: SHIPPED | OUTPATIENT
Start: 2024-04-23 | End: 2025-04-23

## 2024-04-23 RX ADMIN — ONDANSETRON HYDROCHLORIDE 8 MG: 2 INJECTION, SOLUTION INTRAVENOUS at 01:04

## 2024-04-23 RX ADMIN — DIPHENHYDRAMINE HYDROCHLORIDE 50 MG: 25 CAPSULE ORAL at 09:04

## 2024-04-23 RX ADMIN — ONDANSETRON 8 MG: 2 INJECTION INTRAMUSCULAR; INTRAVENOUS at 01:04

## 2024-04-23 RX ADMIN — NAPROXEN SODIUM 81 MG: 220 TABLET, FILM COATED ORAL at 09:04

## 2024-04-23 RX ADMIN — ASPIRIN 81 MG 81 MG: 81 TABLET ORAL at 09:04

## 2024-04-23 RX ADMIN — SODIUM CHLORIDE: 9 INJECTION, SOLUTION INTRAVENOUS at 09:04

## 2024-04-23 RX ADMIN — HYDROCODONE BITARTRATE AND ACETAMINOPHEN 1 TABLET: 7.5; 325 TABLET ORAL at 01:04

## 2024-04-23 RX ADMIN — Medication 50 MG: at 09:04

## 2024-04-23 NOTE — Clinical Note
The catheter was inserted into the and insertion attempt was made into the proximal   right coronary artery.

## 2024-04-23 NOTE — Clinical Note
The catheter was repositioned into the ostial SVG graft. An angiography was performed of the graft. Multiple views were taken. The angiography was performed via power injection. The injected amount was 6 mL contrast at 3 mL/s. The PSI from the power injection was 300. Svg-om

## 2024-04-23 NOTE — Clinical Note
The site was marked. The groin was prepped. The site was prepped with ChloraPrep. The patient was draped.

## 2024-04-23 NOTE — Clinical Note
Observation goals  PRIOR TO DISCHARGE         Comments:   - Improved sCr and consistent urine output: NOT MET     Nurse to notify provider when observation goals have been met and patient is ready for discharge.            330 ml of contrast were injected throughout the case.

## 2024-04-23 NOTE — Clinical Note
The catheter was inserted into the ostial LIMA graft. An angiography was performed of the graft. The angiography was performed via power injection. The injected amount was 8 mL contrast at 4 mL/s. The PSI from the power injection was 400. The result was suboptimal..Lima-lad

## 2024-05-08 ENCOUNTER — OFFICE VISIT (OUTPATIENT)
Dept: CARDIOLOGY | Facility: CLINIC | Age: 74
End: 2024-05-08
Payer: MEDICARE

## 2024-05-08 VITALS
HEIGHT: 71 IN | DIASTOLIC BLOOD PRESSURE: 57 MMHG | HEART RATE: 71 BPM | BODY MASS INDEX: 44.1 KG/M2 | WEIGHT: 315 LBS | SYSTOLIC BLOOD PRESSURE: 112 MMHG

## 2024-05-08 DIAGNOSIS — E11.9 DIABETES MELLITUS WITHOUT COMPLICATION: ICD-10-CM

## 2024-05-08 DIAGNOSIS — Z95.5 HX OF HEART ARTERY STENT: Primary | ICD-10-CM

## 2024-05-08 DIAGNOSIS — I25.10 CORONARY ARTERY DISEASE INVOLVING NATIVE CORONARY ARTERY OF NATIVE HEART, UNSPECIFIED WHETHER ANGINA PRESENT: ICD-10-CM

## 2024-05-08 DIAGNOSIS — Z95.1 S/P CABG (CORONARY ARTERY BYPASS GRAFT): ICD-10-CM

## 2024-05-08 DIAGNOSIS — R94.31 NONSPECIFIC ABNORMAL ELECTROCARDIOGRAM (ECG) (EKG): ICD-10-CM

## 2024-05-08 DIAGNOSIS — E66.01 MORBID OBESITY: ICD-10-CM

## 2024-05-08 DIAGNOSIS — I87.2 VENOUS INSUFFICIENCY: ICD-10-CM

## 2024-05-08 PROCEDURE — 93005 ELECTROCARDIOGRAM TRACING: CPT | Mod: PBBFAC,PN | Performed by: INTERNAL MEDICINE

## 2024-05-08 PROCEDURE — 93010 ELECTROCARDIOGRAM REPORT: CPT | Mod: S$PBB,,, | Performed by: INTERNAL MEDICINE

## 2024-05-08 PROCEDURE — 99215 OFFICE O/P EST HI 40 MIN: CPT | Mod: S$PBB,,, | Performed by: NURSE PRACTITIONER

## 2024-05-08 PROCEDURE — 99214 OFFICE O/P EST MOD 30 MIN: CPT | Mod: PBBFAC,PN | Performed by: NURSE PRACTITIONER

## 2024-05-08 PROCEDURE — 99999 PR PBB SHADOW E&M-EST. PATIENT-LVL IV: CPT | Mod: PBBFAC,,, | Performed by: NURSE PRACTITIONER

## 2024-05-08 RX ORDER — ASPIRIN 81 MG/1
81 TABLET ORAL DAILY
Qty: 30 TABLET | Refills: 11 | Status: SHIPPED | OUTPATIENT
Start: 2024-05-08 | End: 2025-05-08

## 2024-05-08 NOTE — PROGRESS NOTES
Subjective:    Patient ID:  Pelon Lazo is a 73 y.o. male who presents for follow-up   Chief Complaint   Patient presents with    Hospital Follow Up    Hypertension       HPI:      Pelon Lazo is here for follow-up visit. Recently had LHC and PCI via groin access. No issues with groin per patient. Soft, no hematoma. Denies chest pain or shortness of breath. Denies recent fever cough chills or congestion. Denies blood in the urine or blood in the stool.  Denies myalgias. Denies orthopnea or peripheral edema. Denies nausea vomiting or dyspepsia. No recent arm neck or jaw pain. No lightheadedness or dizziness.       Recent CATH 4/23/2024      The Prox RCA to Mid RCA lesion was 99% stenosed with 0% stenosis post-intervention.    The Dist RCA lesion was 80% stenosed with 0% stenosis post-intervention.    Prox RCA to Mid RCA lesion: A STENT FRONTIER KALIE [id number] stent was successfully placed.    Dist RCA lesion: A STENT FRONTIER KALIE [id number] stent was successfully placed.    Successful IVUS-guided PCI of proximal and distal RCA in overlapping manner with old mid RCA stent.  Post dilated proximally with a 4.0 mm NC balloon    The left ventricular end diastolic pressure was 16.       Review of patient's allergies indicates:  No Known Allergies    Past Medical History:   Diagnosis Date    Diabetes mellitus     Diverticulitis     Hyperlipidemia     Hypertension     Sleep apnea      Past Surgical History:   Procedure Laterality Date    ANGIOGRAM, CORONARY, WITH LEFT HEART CATHETERIZATION Left 12/08/2019    Procedure: Left heart cath w/LV;  Surgeon: Kaiden Camargo MD;  Location: [facility] CATH/EP LAB;  Service: Cardiology;  Laterality: Left;    CORONARY ANGIOGRAPHY INCLUDING BYPASS GRAFTS WITH CATHETERIZATION OF LEFT HEART N/A 4/23/2024    Procedure: ANGIOGRAM, CORONARY, INCLUDING BYPASS GRAFT, WITH LEFT HEART CATHETERIZATION;  Surgeon: Moncho Leslie MD;  Location: [facility] CATH/EP LAB;  Service: Cardiology;   Laterality: N/A;    CORONARY ARTERY BYPASS GRAFT      IVUS, CORONARY  2024    Procedure: IVUS, Coronary;  Surgeon: Moncho Leslie MD;  Location: [facility] CATH/EP LAB;  Service: Cardiology;;    KNEE ARTHROPLASTY      PERCUTANEOUS CORONARY INTERVENTION, ARTERY N/A 2024    Procedure: Percutaneous coronary intervention;  Surgeon: Moncho Leslie MD;  Location: [facility] CATH/EP LAB;  Service: Cardiology;  Laterality: N/A;     Social History     Tobacco Use    Smoking status: Former     Types: Cigarettes     Start date:      Quit date: 1970     Years since quittin.3    Smokeless tobacco: Never   Substance Use Topics    Alcohol use: Yes     Alcohol/week: 3.0 standard drinks of alcohol     Types: 3 Cans of beer per week    Drug use: Never     No family history on file.     Review of Systems:   Constitution: Negative for diaphoresis and fever.   HEENT: Negative for nosebleeds.    Cardiovascular: Negative for chest pain       No dyspnea on exertion       No leg swelling        No palpitations  Respiratory: Negative for shortness of breath and wheezing.    Hematologic/Lymphatic: Negative for bleeding problem. Does not bruise/bleed easily.   Skin: Negative for color change and rash.   Musculoskeletal: Negative for falls and myalgias.   Gastrointestinal: Negative for hematemesis and hematochezia.   Genitourinary: Negative for hematuria.   Neurological: Negative for dizziness and light-headedness.   Psychiatric/Behavioral: Negative for altered mental status and memory loss.          Objective:        Vitals:    24 1024   BP: (!) 112/57   Pulse: 71       Lab Results   Component Value Date    WBC 8.41 2024    HGB 14.9 2024    HCT 44.4 2024     2024    CHOL 135 2019    TRIG 205 (H) 2019    HDL 22 (L) 2019    ALT 22 2019    AST 18 2019     (L) 2024    K 4.3 2024     2024    CREATININE 0.8 2024    BUN 15 2024     CO2 27 04/23/2024    TSH 2.580 12/06/2019    INR 1.0 12/06/2019    HGBA1C 8.4 (A) 07/14/2022        ECHOCARDIOGRAM RESULTS  Results for orders placed during the hospital encounter of 12/06/19    Echo Color Flow Doppler? Yes    Interpretation Summary  · Moderate concentric left ventricular hypertrophy.  · Normal left ventricular systolic function. The estimated ejection fraction is 60%  · Grade I (mild) left ventricular diastolic dysfunction consistent with impaired relaxation.  · Normal right ventricular systolic function.  · Mild left atrial enlargement.        CURRENT/PREVIOUS VISIT EKG  Results for orders placed or performed during the hospital encounter of 04/23/24   EKG 12-LEAD on arrival to floor    Collection Time: 04/23/24  1:31 PM   Result Value Ref Range    QRS Duration 118 ms    [facility] QTC Calculation 481 ms    Narrative    Test Reason : I25.10,    Vent. Rate : 067 BPM     Atrial Rate : 067 BPM     P-R Int : 222 ms          QRS Dur : 118 ms      QT Int : 456 ms       P-R-T Axes : 062 -42 099 degrees     QTc Int : 481 ms    Sinus rhythm with 1st degree A-V block  Left axis deviation  Cannot rule out Anterior infarct ,age undetermined  T wave abnormality, consider lateral ischemia  Abnormal ECG  When compared with ECG of 23-APR-2024 07:42,  The axis Shifted left  Minimal criteria for Anterior infarct are now Present    Referred By: ANITA GUZMAN           Confirmed By:      No valid procedures specified.   Results for orders placed during the hospital encounter of 12/06/19    Treadmill Stress Test    Interpretation Summary    The EKG portion of this study is positive for ischemia.    The patient reported no chest pain during the stress test.    ECG Stress Nuclear portion of this study will be reported separately.      Physical Exam:  CONSTITUTIONAL: No fever, no chills  HEENT: Normocephalic, atraumatic,pupils reactive to light                 NECK:  No JVD no carotid bruit  CVS: S1S2+, RRR, no murmurs,    LUNGS: Clear  ABDOMEN: Soft, NT, BS+  EXTREMITIES: No cyanosis, edema  : No blood catheter  NEURO: AAO X 3  PSY: Normal affect      Medication List with Changes/Refills   Current Medications    CLOPIDOGREL (PLAVIX) 75 MG TABLET    Take 1 tablet (75 mg total) by mouth once daily.    CLOPIDOGREL (PLAVIX) 75 MG TABLET    Take 1 tablet (75 mg total) by mouth once daily.    DOCUSATE SODIUM (COLACE) 100 MG CAPSULE    Take 100 mg by mouth daily as needed.    DOXEPIN (SILENOR) 6 MG TAB    Take 6 mg by mouth nightly as needed.    ERGOCALCIFEROL (ERGOCALCIFEROL) 50,000 UNIT CAP    Take 50,000 Units by mouth every 7 days.    FERROUS SULFATE (FEOSOL) 325 MG (65 MG IRON) TAB TABLET    Take 325 mg by mouth once daily.    GLIMEPIRIDE (AMARYL) 4 MG TABLET    Take 4 mg by mouth 2 (two) times daily.    HYDROCODONE-ACETAMINOPHEN (NORCO) 7.5-325 MG PER TABLET    Take 1 tablet by mouth every 12 (twelve) hours as needed for Pain.    INSULIN GLARGINE 100 UNITS/ML (3ML) SUBQ PEN    Inject 50 Units into the skin every evening.     ISOSORBIDE MONONITRATE (IMDUR) 30 MG 24 HR TABLET    Take 1 tablet (30 mg total) by mouth once daily.    METOPROLOL TARTRATE (LOPRESSOR) 25 MG TABLET    Take 0.5 tablets (12.5 mg total) by mouth 2 (two) times daily.    MUPIROCIN (BACTROBAN) 2 % OINTMENT    Apply topically 3 (three) times daily.    NIACIN 500 MG TAB    Take 500 mg by mouth once daily.    OMEPRAZOLE 20 MG TBEC    Take 20 mg by mouth once daily.    PRAVASTATIN (PRAVACHOL) 20 MG TABLET    Take 20 mg by mouth once daily.    TIRZEPATIDE 2.5 MG/0.5 ML PNIJ    Inject 2.5 mg into the skin every 7 days.    TRAZODONE (DESYREL) 50 MG TABLET    Take 50 mg by mouth every evening. Patient states he takes 4mg.   Changed and/or Refilled Medications    Modified Medication Previous Medication    ASPIRIN (ECOTRIN) 81 MG EC TABLET aspirin (ECOTRIN) 81 MG EC tablet       Take 1 tablet (81 mg total) by mouth once daily.    Take 1 tablet (81 mg total) by mouth once  daily.             Assessment:       1. Hx of heart artery stent    2. Nonspecific abnormal electrocardiogram (ECG) (EKG)    3. S/P CABG (coronary artery bypass graft)    4. Morbid obesity    5. Coronary artery disease involving native coronary artery of native heart, unspecified whether angina present    6. Diabetes mellitus without complication    7. Venous insufficiency         Plan:       Recommend the following  Continue Plavix, ASA  Continue Statin  Continue Imdur  Continue leg exercises for venous insufficiency  Recommend low fat low cholesterol diet  He is working daily and does not have time for cardiac rehab.  No bleeding tendencies      Hypertension Medications               isosorbide mononitrate (IMDUR) 30 MG 24 hr tablet Take 1 tablet (30 mg total) by mouth once daily.    metoprolol tartrate (LOPRESSOR) 25 MG tablet Take 0.5 tablets (12.5 mg total) by mouth 2 (two) times daily.             Diabetes Medications               glimepiride (AMARYL) 4 MG tablet Take 4 mg by mouth 2 (two) times daily.    insulin glargine 100 units/mL (3mL) SubQ pen Inject 50 Units into the skin every evening.     tirzepatide 2.5 mg/0.5 mL PnIj Inject 2.5 mg into the skin every 7 days.           Hyperlipidemia Medications               pravastatin (PRAVACHOL) 20 MG tablet Take 20 mg by mouth once daily.    niacin 500 MG Tab Take 500 mg by mouth once daily.               Problem List Items Addressed This Visit          Cardiac/Vascular    S/P CABG (coronary artery bypass graft) (Chronic)    Venous insufficiency (Chronic)    Coronary artery disease involving native coronary artery of native heart    Hx of heart artery stent - Primary    Relevant Orders    IN OFFICE EKG 12-LEAD (to [place])    Ambulatory referral/consult to Cardiac Rehab    Nonspecific abnormal electrocardiogram (ECG) (EKG)       Endocrine    Morbid obesity (Chronic)    Diabetes mellitus without complication       No follow-ups on file.       Carmen Baxter,  ROLF-HARSHP, CVNP-BC

## 2024-05-14 LAB
OHS QRS DURATION: 118 MS
OHS QTC CALCULATION: 486 MS

## 2024-05-15 LAB
OHS QRS DURATION: 118 MS
OHS QTC CALCULATION: 481 MS

## 2024-05-21 LAB
OHS QRS DURATION: 122 MS
OHS QTC CALCULATION: 482 MS

## 2024-05-27 LAB
OHS QRS DURATION: 110 MS
OHS QTC CALCULATION: 470 MS

## 2024-12-29 ENCOUNTER — OFFICE VISIT (OUTPATIENT)
Dept: URGENT CARE | Facility: CLINIC | Age: 74
End: 2024-12-29
Payer: MEDICARE

## 2024-12-29 VITALS
HEIGHT: 71 IN | SYSTOLIC BLOOD PRESSURE: 135 MMHG | WEIGHT: 315 LBS | OXYGEN SATURATION: 96 % | DIASTOLIC BLOOD PRESSURE: 64 MMHG | BODY MASS INDEX: 44.1 KG/M2 | RESPIRATION RATE: 20 BRPM | HEART RATE: 76 BPM | TEMPERATURE: 98 F

## 2024-12-29 DIAGNOSIS — R05.1 ACUTE COUGH: ICD-10-CM

## 2024-12-29 DIAGNOSIS — E08.00 DIABETES MELLITUS DUE TO UNDERLYING CONDITION WITH HYPEROSMOLARITY WITHOUT COMA, WITHOUT LONG-TERM CURRENT USE OF INSULIN: ICD-10-CM

## 2024-12-29 DIAGNOSIS — J02.9 SORE THROAT: Primary | ICD-10-CM

## 2024-12-29 DIAGNOSIS — J40 BRONCHITIS: ICD-10-CM

## 2024-12-29 LAB
CTP QC/QA: YES
FLUAV AG NPH QL: NEGATIVE
FLUBV AG NPH QL: NEGATIVE
S PYO RRNA THROAT QL PROBE: NEGATIVE
SARS-COV-2 AG RESP QL IA.RAPID: NEGATIVE

## 2024-12-29 PROCEDURE — 87880 STREP A ASSAY W/OPTIC: CPT | Mod: QW,,,

## 2024-12-29 PROCEDURE — 99214 OFFICE O/P EST MOD 30 MIN: CPT | Mod: S$GLB,,,

## 2024-12-29 PROCEDURE — 87804 INFLUENZA ASSAY W/OPTIC: CPT | Mod: QW,,,

## 2024-12-29 PROCEDURE — 87811 SARS-COV-2 COVID19 W/OPTIC: CPT | Mod: QW,S$GLB,,

## 2024-12-29 RX ORDER — DOXYCYCLINE 100 MG/1
100 CAPSULE ORAL 2 TIMES DAILY
Qty: 20 CAPSULE | Refills: 0 | Status: SHIPPED | OUTPATIENT
Start: 2024-12-29 | End: 2025-01-08

## 2024-12-29 RX ORDER — GUAIFENESIN AND DEXTROMETHORPHAN HYDROBROMIDE 10; 100 MG/5ML; MG/5ML
5 SYRUP ORAL EVERY 6 HOURS PRN
Qty: 118 ML | Refills: 0 | Status: SHIPPED | OUTPATIENT
Start: 2024-12-29 | End: 2025-01-08

## 2024-12-29 RX ORDER — TIZANIDINE 4 MG/1
4 TABLET ORAL
COMMUNITY
Start: 2024-04-09

## 2024-12-29 RX ORDER — ROPINIROLE 0.5 MG/1
1 TABLET, FILM COATED ORAL NIGHTLY
COMMUNITY
Start: 2024-04-28

## 2024-12-29 RX ORDER — LEVOCETIRIZINE DIHYDROCHLORIDE 5 MG/1
5 TABLET, FILM COATED ORAL NIGHTLY
Qty: 30 TABLET | Refills: 0 | Status: SHIPPED | OUTPATIENT
Start: 2024-12-29

## 2024-12-29 RX ORDER — POTASSIUM CHLORIDE 20 MEQ/1
1 TABLET, EXTENDED RELEASE ORAL DAILY
COMMUNITY
Start: 2024-04-28

## 2024-12-29 RX ORDER — IPRATROPIUM BROMIDE AND ALBUTEROL SULFATE 2.5; .5 MG/3ML; MG/3ML
3 SOLUTION RESPIRATORY (INHALATION) EVERY 6 HOURS PRN
Qty: 75 ML | Refills: 0 | Status: SHIPPED | OUTPATIENT
Start: 2024-12-29 | End: 2025-12-29

## 2024-12-29 NOTE — PROGRESS NOTES
"Subjective:      Patient ID: Pelon Lazo is a 74 y.o. male.    Vitals:  height is 5' 11" (1.803 m) and weight is 145.6 kg (321 lb) (abnormal). His temperature is 98.1 °F (36.7 °C). His blood pressure is 135/64 and his pulse is 76. His respiration is 20 and oxygen saturation is 96%.     Chief Complaint: Cough    Patient presents to the clinic with complaint of cough, congestion, wheezing, and post nasal drip.     Patient Active Problem List:     NSTEMI (non-ST elevated myocardial infarction)     S/P CABG (coronary artery bypass graft)     Diabetes mellitus without complication     Hypertension     Morbid obesity     Coronary artery disease involving native coronary artery of native heart     Diverticulosis     Venous insufficiency     Dyslipidemia     Chronic lower back pain     H/O right nephrectomy     GABBY on CPAP     Diabetes mellitus due to underlying condition with hyperosmolarity without coma, without long-term current use of insulin     Hx of heart artery stent     Nonspecific abnormal electrocardiogram (ECG) (EKG)    Symptoms started 3-4 days ago with cough, congestion, post nasal drip, and wheezing. Has been taking Afrin, cough drops, and Nyquil without relief.     Cough  This is a new problem. The current episode started in the past 7 days (x's 3 days). The problem has been gradually worsening. The cough is Productive of sputum. Associated symptoms include nasal congestion, postnasal drip, a sore throat and wheezing. Pertinent negatives include no chest pain, chills, ear pain, fever, headaches or shortness of breath. Nothing aggravates the symptoms. Treatments tried: cough drops, Afrin, Nyquil. The treatment provided no relief.       Constitution: Negative for appetite change, chills, sweating, fatigue, fever and generalized weakness.   HENT:  Positive for congestion, postnasal drip and sore throat. Negative for ear pain, sinus pain, sinus pressure, trouble swallowing and voice change.    Neck: " Negative for neck pain, neck stiffness, painful lymph nodes and neck swelling.   Cardiovascular:  Negative for chest pain, leg swelling and palpitations.   Respiratory:  Positive for cough and wheezing. Negative for chest tightness and shortness of breath.    Gastrointestinal:  Negative for abdominal pain, nausea, vomiting, constipation and diarrhea.   Genitourinary:  Negative for dysuria, frequency, urgency and urine decreased.   Skin:  Negative for color change and pale.   Allergic/Immunologic: Negative for chronic cough.   Neurological:  Negative for dizziness, headaches, disorientation and altered mental status.   Hematologic/Lymphatic: Negative for swollen lymph nodes.   Psychiatric/Behavioral:  Negative for altered mental status, disorientation and confusion.       Objective:     Physical Exam   Constitutional: He is oriented to person, place, and time. He appears well-developed. He is cooperative.   HENT:   Head: Normocephalic and atraumatic.   Ears:   Right Ear: Hearing, tympanic membrane, external ear and ear canal normal.   Left Ear: Hearing, tympanic membrane, external ear and ear canal normal.   Nose: Nose normal. No mucosal edema or nasal deformity. No epistaxis. Right sinus exhibits no maxillary sinus tenderness and no frontal sinus tenderness. Left sinus exhibits no maxillary sinus tenderness and no frontal sinus tenderness.   Mouth/Throat: Uvula is midline and mucous membranes are normal. No trismus in the jaw. Normal dentition. No uvula swelling. Posterior oropharyngeal erythema present. No oropharyngeal exudate.   Eyes: Conjunctivae and lids are normal.   Neck: Trachea normal and phonation normal. Neck supple.   Cardiovascular: Normal rate, regular rhythm, normal heart sounds and normal pulses.   Pulmonary/Chest: Effort normal and breath sounds normal.   Abdominal: Normal appearance and bowel sounds are normal. Soft.   Musculoskeletal: Normal range of motion.         General: Normal range of  motion.   Neurological: He is alert and oriented to person, place, and time. He exhibits normal muscle tone.   Skin: Skin is warm, dry and intact.   Psychiatric: His speech is normal and behavior is normal. Judgment and thought content normal.   Nursing note and vitals reviewed.      Assessment:     1. Sore throat    2. Diabetes mellitus due to underlying condition with hyperosmolarity without coma, without long-term current use of insulin    3. Bronchitis    4. Acute cough        Plan:       Sore throat  -     SARS Coronavirus 2 Antigen, POCT Manual Read  -     POCT Influenza A/B Rapid Antigen  -     POCT rapid strep A    Diabetes mellitus due to underlying condition with hyperosmolarity without coma, without long-term current use of insulin    Bronchitis    Acute cough    Other orders  -     doxycycline (VIBRAMYCIN) 100 MG Cap; Take 1 capsule (100 mg total) by mouth 2 (two) times daily. for 10 days  Dispense: 20 capsule; Refill: 0  -     levocetirizine (XYZAL) 5 MG tablet; Take 1 tablet (5 mg total) by mouth every evening.  Dispense: 30 tablet; Refill: 0  -     albuterol-ipratropium (DUO-NEB) 2.5 mg-0.5 mg/3 mL nebulizer solution; Take 3 mLs by nebulization every 6 (six) hours as needed for Wheezing. Rescue  Dispense: 75 mL; Refill: 0  -     dextromethorphan-guaiFENesin  mg/5 ml (ROBITUSSIN-DM)  mg/5 mL liquid; Take 5 mLs by mouth every 6 (six) hours as needed (cough).  Dispense: 118 mL; Refill: 0       - Negative covid, Negative flu, Negative strep  - Rotate Tylenol as directed for pain and fever  - Take medications as prescribed.  - Assure adequate hydration.  - Follow-up with PCP in 1-2 days.  - Return to clinic as needed.  - To ED for any new or acutely worsening symptoms including but not limited to chest pain, palpitations, shortness of breath, or fever greater than 103° F.  Patient in agreement with plan of care.     - The diagnosis, treatment plan, instructions for follow-up and reevaluation as  well as ED precautions were discussed and understanding was verbalized. All questions or concerns have been addressed.

## 2024-12-29 NOTE — PATIENT INSTRUCTIONS
Thank you for allowing me to be part of your healthcare team at [facility]. It is a pleasure to care for you today.   Please take all of your medications as instructed and follow all new instructions from your visit today.  If you received labs or medical tests today you should hear information about results or scheduling either by phone or mychart within approximately a week.   If you have any questions or concerns please do not hesitate to call. Have a blessed day.   DINA Ortez

## 2025-03-03 ENCOUNTER — OFFICE VISIT (OUTPATIENT)
Dept: URGENT CARE | Facility: CLINIC | Age: 75
End: 2025-03-03
Payer: MEDICARE

## 2025-03-03 VITALS
BODY MASS INDEX: 44.1 KG/M2 | HEART RATE: 76 BPM | TEMPERATURE: 98 F | OXYGEN SATURATION: 99 % | SYSTOLIC BLOOD PRESSURE: 130 MMHG | DIASTOLIC BLOOD PRESSURE: 64 MMHG | RESPIRATION RATE: 21 BRPM | WEIGHT: 315 LBS | HEIGHT: 71 IN

## 2025-03-03 DIAGNOSIS — L03.90 CELLULITIS, UNSPECIFIED CELLULITIS SITE: Primary | ICD-10-CM

## 2025-03-03 PROCEDURE — 99214 OFFICE O/P EST MOD 30 MIN: CPT | Mod: S$GLB,,,

## 2025-03-03 RX ORDER — DOXYCYCLINE 100 MG/1
100 CAPSULE ORAL 2 TIMES DAILY
Qty: 20 CAPSULE | Refills: 0 | Status: SHIPPED | OUTPATIENT
Start: 2025-03-03 | End: 2025-03-13

## 2025-03-03 RX ORDER — LISINOPRIL 10 MG/1
10 TABLET ORAL
COMMUNITY

## 2025-03-03 RX ORDER — NYSTATIN 100000 U/G
CREAM TOPICAL 2 TIMES DAILY
COMMUNITY
Start: 2024-07-23

## 2025-03-03 RX ORDER — GABAPENTIN 300 MG/1
300 CAPSULE ORAL 3 TIMES DAILY
COMMUNITY

## 2025-03-03 RX ORDER — FUROSEMIDE 20 MG/1
1 TABLET ORAL DAILY
COMMUNITY

## 2025-03-03 RX ORDER — PIOGLITAZONEHYDROCHLORIDE 15 MG/1
15 TABLET ORAL
COMMUNITY
Start: 2025-02-05

## 2025-03-03 RX ORDER — NIACIN 50 MG
500 TABLET ORAL
COMMUNITY

## 2025-03-03 RX ORDER — LANOLIN ALCOHOL/MO/W.PET/CERES
1 CREAM (GRAM) TOPICAL EVERY MORNING
COMMUNITY

## 2025-03-03 NOTE — PROGRESS NOTES
"Subjective:      Patient ID: Pelon Lazo is a 74 y.o. male.    Vitals:  height is 5' 11" (1.803 m) and weight is 148.8 kg (328 lb) (abnormal). His temperature is 98.4 °F (36.9 °C). His blood pressure is 130/64 and his pulse is 76. His respiration is 21 (abnormal) and oxygen saturation is 99%.     Chief Complaint: Edema    Pt states that for the past several weeks he has been having bilateral lower leg swelling with erythema. Pt states that he has a hx of cellulitis in his legs and believes this is what he is having again.     Edema  This is a recurrent problem. The current episode started 1 to 4 weeks ago (x's 1 week). The problem has been gradually worsening. Associated symptoms include joint swelling. He has tried nothing for the symptoms.       Constitution: Negative.   HENT: Negative.     Neck: neck negative.   Cardiovascular: Negative.    Eyes: Negative.    Respiratory: Negative.     Gastrointestinal: Negative.    Endocrine: negative.   Genitourinary: Negative.    Musculoskeletal:  Positive for joint swelling. Negative for trauma.   Skin: Negative.  Positive for erythema.   Allergic/Immunologic: Negative.    Neurological: Negative.    Hematologic/Lymphatic: Negative.    Psychiatric/Behavioral: Negative.        Objective:     Physical Exam   Constitutional: He is oriented to person, place, and time. obesity  HENT:   Head: Normocephalic and atraumatic.   Ears:   Right Ear: External ear normal.   Left Ear: External ear normal.   Nose: Nose normal.   Eyes: Conjunctivae are normal. Pupils are equal, round, and reactive to light. Extraocular movement intact   Neck: Neck supple.   Cardiovascular: Normal rate, regular rhythm, normal heart sounds and normal pulses.   Pulmonary/Chest: Effort normal and breath sounds normal.   Abdominal: Normal appearance.   Musculoskeletal: Normal range of motion.         General: Swelling present. Normal range of motion.   Neurological: no focal deficit. He is alert, oriented to " person, place, and time and at baseline.   Skin: erythema   Psychiatric: His behavior is normal. Mood, judgment and thought content normal.   Nursing note and vitals reviewed.      Assessment:     1. Cellulitis, unspecified cellulitis site              Plan:       Cellulitis, unspecified cellulitis site  -     doxycycline (VIBRAMYCIN) 100 MG Cap; Take 1 capsule (100 mg total) by mouth 2 (two) times daily. for 10 days  Dispense: 20 capsule; Refill: 0

## 2025-03-03 NOTE — PATIENT INSTRUCTIONS
You must understand that you've received an Urgent Care treatment only and that you may be released before all your medical problems are known or treated. You, the patient, will arrange for follow up care as instructed.  Follow up with your PCP or specialty clinic as directed in the next 1-2 weeks if not improved or as needed.  You can call [phone] to schedule an appointment with the appropriate provider.  If your condition worsens we recommend that you receive another evaluation at the emergency room immediately or contact your primary medical clinics after hours call service to discuss your concerns.  Please return here or go to the Emergency Department for any concerns or worsening of condition.  Please if you smoke please consider quitting. [facility] Smoke cessation hotline number is [phone], available at this number is free counseling and medications to live a healthier life!         If you were prescribed a narcotic or controlled medication, do not drive or operate heavy equipment or machinery while taking these medications.

## 2025-03-20 ENCOUNTER — TELEPHONE (OUTPATIENT)
Dept: CARDIOLOGY | Facility: CLINIC | Age: 75
End: 2025-03-20
Payer: MEDICARE

## 2025-03-20 NOTE — TELEPHONE ENCOUNTER
----- Message from Anabellwhitleyjerson sent at 3/20/2025  4:03 PM CDT -----  Regarding: appt  Type:  Sooner Apoointment RequestName of Caller:ptWhen is the first available appointment?dept booked Symptoms:annual fuBest Call Back Number:[phone] Information: pt wants to be schedule.  please call to discuss.

## 2025-03-21 ENCOUNTER — TELEPHONE (OUTPATIENT)
Dept: CARDIOLOGY | Facility: CLINIC | Age: 75
End: 2025-03-21
Payer: MEDICARE

## 2025-03-21 NOTE — TELEPHONE ENCOUNTER
----- Message from Suzan sent at 3/21/2025  9:16 AM CDT -----  Return the call and spoke to the patient wife and she stated that he is having swelling in his leg(this was not put in the message) and she would like to know if the patient need to come in soon. Please give her a call .

## 2025-04-24 ENCOUNTER — OFFICE VISIT (OUTPATIENT)
Dept: CARDIOLOGY | Facility: CLINIC | Age: 75
End: 2025-04-24
Payer: MEDICARE

## 2025-04-24 VITALS
DIASTOLIC BLOOD PRESSURE: 63 MMHG | OXYGEN SATURATION: 98 % | WEIGHT: 315 LBS | BODY MASS INDEX: 44.1 KG/M2 | HEIGHT: 71 IN | HEART RATE: 70 BPM | SYSTOLIC BLOOD PRESSURE: 127 MMHG

## 2025-04-24 DIAGNOSIS — E78.5 DYSLIPIDEMIA: ICD-10-CM

## 2025-04-24 DIAGNOSIS — E66.01 MORBID OBESITY: Chronic | ICD-10-CM

## 2025-04-24 DIAGNOSIS — E08.00 DIABETES MELLITUS DUE TO UNDERLYING CONDITION WITH HYPEROSMOLARITY WITHOUT COMA, WITHOUT LONG-TERM CURRENT USE OF INSULIN: ICD-10-CM

## 2025-04-24 DIAGNOSIS — K57.90 DIVERTICULOSIS: Chronic | ICD-10-CM

## 2025-04-24 DIAGNOSIS — G47.33 OSA ON CPAP: Chronic | ICD-10-CM

## 2025-04-24 DIAGNOSIS — R09.89 RIGHT CAROTID BRUIT: ICD-10-CM

## 2025-04-24 DIAGNOSIS — I10 PRIMARY HYPERTENSION: Chronic | ICD-10-CM

## 2025-04-24 DIAGNOSIS — Z95.1 S/P CABG (CORONARY ARTERY BYPASS GRAFT): Primary | ICD-10-CM

## 2025-04-24 DIAGNOSIS — I25.10 CORONARY ARTERY DISEASE INVOLVING NATIVE CORONARY ARTERY OF NATIVE HEART, UNSPECIFIED WHETHER ANGINA PRESENT: ICD-10-CM

## 2025-04-24 LAB
OHS QRS DURATION: 116 MS
OHS QTC CALCULATION: 450 MS

## 2025-04-24 PROCEDURE — 99215 OFFICE O/P EST HI 40 MIN: CPT | Mod: PBBFAC,PN | Performed by: NURSE PRACTITIONER

## 2025-04-24 PROCEDURE — 99999 PR PBB SHADOW E&M-EST. PATIENT-LVL V: CPT | Mod: PBBFAC,,, | Performed by: NURSE PRACTITIONER

## 2025-04-24 RX ORDER — ISOSORBIDE MONONITRATE 30 MG/1
30 TABLET, EXTENDED RELEASE ORAL DAILY
Qty: 90 TABLET | Refills: 3 | Status: SHIPPED | OUTPATIENT
Start: 2025-04-24 | End: 2026-04-24

## 2025-04-24 RX ORDER — PRAVASTATIN SODIUM 20 MG/1
20 TABLET ORAL DAILY
Qty: 90 TABLET | Refills: 3 | Status: SHIPPED | OUTPATIENT
Start: 2025-04-24

## 2025-04-24 RX ORDER — METOPROLOL TARTRATE 25 MG/1
12.5 TABLET, FILM COATED ORAL 2 TIMES DAILY
Qty: 90 TABLET | Refills: 3 | Status: SHIPPED | OUTPATIENT
Start: 2025-04-24 | End: 2026-04-19

## 2025-04-24 RX ORDER — FUROSEMIDE 20 MG/1
20 TABLET ORAL DAILY
Qty: 90 TABLET | Refills: 3 | Status: SHIPPED | OUTPATIENT
Start: 2025-04-24

## 2025-04-24 NOTE — ASSESSMENT & PLAN NOTE
Blood pressure is 127/63 mm Hg in the office today.  Continue Imdur 30 mg daily, lisinopril 10 mg daily, Lopressor 12.5 mg b.i.d. avoid high sodium foods.

## 2025-04-24 NOTE — ASSESSMENT & PLAN NOTE
Patient is on aspirin 81 mg daily, Lopressor 12.5 mg b.i.d., and pravastatin 20 mg q.h.s.  He has no anginal symptoms nor EKG changes noted

## 2025-04-24 NOTE — PROGRESS NOTES
Subjective:    Patient ID:  Pelon Lazo is a 74 y.o. male patient here for evaluation Annual Exam (No issues stated )    History of Present Illness:     Patient is 74-year-old male with a history of CABG in 2001 who is in clinic today for routine follow-up  Patient states he is a  and has had no cardiac complaints to report.  He has had no headaches, dizziness; carotid bruit was noted and he states this is new for him  He needs refills on medications today          Most Recent Echocardiogram Results  Results for orders placed during the hospital encounter of 12/06/19    Echo Color Flow Doppler? Yes    Interpretation Summary  · Moderate concentric left ventricular hypertrophy.  · Normal left ventricular systolic function. The estimated ejection fraction is 60%  · Grade I (mild) left ventricular diastolic dysfunction consistent with impaired relaxation.  · Normal right ventricular systolic function.  · Mild left atrial enlargement.      Most Recent Nuclear Stress Test Results  Results for orders placed during the hospital encounter of 12/06/19    Treadmill Stress Test    Interpretation Summary    The EKG portion of this study is positive for ischemia.    The patient reported no chest pain during the stress test.    ECG Stress Nuclear portion of this study will be reported separately.      Most Recent Cardiac PET Stress Test Results  No results found for this or any previous visit.      Most Recent Cardiovascular Angiogram results  Results for orders placed during the hospital encounter of 04/23/24    Cardiac catheterization    Conclusion    The Prox RCA to Mid RCA lesion was 99% stenosed with 0% stenosis post-intervention.    The Dist RCA lesion was 80% stenosed with 0% stenosis post-intervention.    Prox RCA to Mid RCA lesion: A STENT FRONTIER KALIE [id number] stent was successfully placed.    Dist RCA lesion: A STENT FRONTIER KALIE [id number] stent was successfully placed.    Successful IVUS-guided PCI  of proximal and distal RCA in overlapping manner with old mid RCA stent.  Post dilated proximally with a 4.0 mm NC balloon    The left ventricular end diastolic pressure was 16.    The procedure log was documented by Documenter: Lamberto Macdonald, BRENNA; Lauren Coley RT; Tschume, Sarah, RN and verified by Moncho Leslie MD.    Date: 4/23/2024  Time: 7:56 PM      Other Most Recent Cardiology Results  Results for orders placed during the hospital encounter of 12/06/19    CARDIAC MONITORING STRIPS      REVIEW OF SYSTEMS: As noted in HPI       Past Medical History:   Diagnosis Date    Diabetes mellitus     Diverticulitis     Hyperlipidemia     Hypertension     Sleep apnea      Past Surgical History:   Procedure Laterality Date    ANGIOGRAM, CORONARY, WITH LEFT HEART CATHETERIZATION Left 12/08/2019    Procedure: Left heart cath w/LV;  Surgeon: Kaiden Camargo MD;  Location: [facility] CATH/EP LAB;  Service: Cardiology;  Laterality: Left;    CORONARY ANGIOGRAPHY INCLUDING BYPASS GRAFTS WITH CATHETERIZATION OF LEFT HEART N/A 4/23/2024    Procedure: ANGIOGRAM, CORONARY, INCLUDING BYPASS GRAFT, WITH LEFT HEART CATHETERIZATION;  Surgeon: Moncho Leslie MD;  Location: [facility] CATH/EP LAB;  Service: Cardiology;  Laterality: N/A;    CORONARY ARTERY BYPASS GRAFT      IVUS, CORONARY  4/23/2024    Procedure: IVUS, Coronary;  Surgeon: Moncho Leslie MD;  Location: [facility] CATH/EP LAB;  Service: Cardiology;;    KNEE ARTHROPLASTY      PERCUTANEOUS CORONARY INTERVENTION, ARTERY N/A 4/23/2024    Procedure: Percutaneous coronary intervention;  Surgeon: Moncho Leslie MD;  Location: [facility] CATH/EP LAB;  Service: Cardiology;  Laterality: N/A;     Social History[1]      Objective      Vitals:    04/24/25 0943   BP: 127/63   Pulse: 70       LAST EKG  Results for orders placed or performed in visit on 04/24/25   IN OFFICE EKG 12-LEAD (to [place])    Collection Time: 04/24/25  9:42 AM   Result Value Ref Range    QRS Duration 116 ms    [facility] QTC  Calculation 450 ms    Narrative    Test Reason : Z95.1,    Vent. Rate :  66 BPM     Atrial Rate :  66 BPM     P-R Int : 222 ms          QRS Dur : 116 ms      QT Int : 430 ms       P-R-T Axes :  39 -13 103 degrees    QTcB Int : 450 ms    Sinus rhythm with 1st degree A-V block with frequent Premature ventricular  complexes  Inferior infarct (cited on or before 26-Mar-2024)  Abnormal ECG  When compared with ECG of 08-May-2024 10:34,  Premature ventricular complexes are now Present  Nonspecific T wave abnormality has replaced inverted T waves in Lateral  leads    Referred By:            Confirmed By:      LIPIDS - LAST 2   Lab Results   Component Value Date    CHOL 135 12/07/2019    HDL 22 (L) 12/07/2019    LDLCALC 72.0 12/07/2019    TRIG 205 (H) 12/07/2019    CHOLHDL 16.3 (L) 12/07/2019     CARDIAC PROFILE - LAST 2  Lab Results   Component Value Date    BNP 32 12/06/2019    TROPONINI 0.931 (HH) 12/09/2019    TROPONINI 0.559 (HH) 12/06/2019      CBC - LAST 2  Lab Results   Component Value Date    WBC 8.41 04/23/2024    WBC 9.96 12/10/2019    RBC 4.76 04/23/2024    RBC 3.16 (L) 12/10/2019    HGB 8.6 (L) 02/13/2025    HGB 8.6 (L) 02/13/2025    HCT 26.1 (L) 02/13/2025    HCT 27 (L) 02/13/2025     04/23/2024     12/10/2019     Lab Results   Component Value Date    LABPT 13.0 12/06/2019    INR 1.0 12/06/2019     CHEMISTRY - LAST 2  Lab Results   Component Value Date     (L) 04/23/2024     12/10/2019    K 4.3 04/23/2024    K 4.0 12/10/2019     04/23/2024     12/10/2019    CO2 27 04/23/2024    CO2 26 12/10/2019    ANIONGAP 6 (L) 04/23/2024    ANIONGAP 9 12/10/2019    BUN 15 04/23/2024    BUN 19 12/10/2019    CREATININE 0.8 04/23/2024    CREATININE 0.9 12/10/2019     (H) 04/23/2024     (H) 12/10/2019    CALCIUM 9.1 04/23/2024    CALCIUM 9.4 12/10/2019    MG 1.7 12/10/2019    MG 1.7 12/09/2019    ALBUMIN 4.1 12/06/2019    PROT 7.1 12/06/2019    ALKPHOS 44 (L) 12/06/2019    ALT  22 12/06/2019    AST 18 12/06/2019    BILITOT 0.9 12/06/2019      ENDOCRINE - LAST 2  Lab Results   Component Value Date    HGBA1C 8.4 (A) 07/14/2022    HGBA1C 9.9 (A) 04/06/2022    TSH 2.580 12/06/2019        PHYSICAL EXAM  CONSTITUTIONAL: Well built, well nourished elderly male breathing comfortably in no apparent distress  NECK: + right carotid bruit, no JVD  LUNGS: CTA  CHEST WALL: no tenderness  HEART: regular rate and rhythm, S1, S2 normal, + systolic murmur  ABDOMEN: soft, obese non-tender; bowel sounds normal; no masses  EXTREMITIES: Extremities normal, no edema, no calf tenderness noted  NEURO: AAO X 3, speech clear, memory clear    I HAVE REVIEWED :    The vital signs, most recent cardiac testing, and most recent pertinent non-cardiology provider notes.    Current Outpatient Medications   Medication Instructions    albuterol-ipratropium (DUO-NEB) 2.5 mg-0.5 mg/3 mL nebulizer solution 3 mLs, Nebulization, Every 6 hours PRN, Rescue    aspirin (ECOTRIN) 81 mg, Oral, Daily    cyanocobalamin (VITAMIN B-12) 1000 MCG tablet 1 tablet, Every morning    docusate sodium (COLACE) 100 mg, Daily PRN    doxepin (SILENOR) 6 mg, Nightly PRN    ergocalciferol (ERGOCALCIFEROL) 50,000 Units, Every 7 days    ferrous sulfate (FEOSOL) 325 mg, Daily    furosemide (LASIX) 20 mg, Oral, Daily    gabapentin (NEURONTIN) 300 mg, 3 times daily    glimepiride (AMARYL) 4 mg, 2 times daily    HYDROcodone-acetaminophen (NORCO) 7.5-325 mg per tablet 1 tablet, Every 12 hours PRN    insulin glargine U-100 (Lantus) 50 Units, Nightly    isosorbide mononitrate (IMDUR) 30 mg, Oral, Daily    levocetirizine (XYZAL) 5 mg, Oral, Nightly    lisinopriL 10 mg    metoprolol tartrate (LOPRESSOR) 12.5 mg, Oral, 2 times daily    mupirocin (BACTROBAN) 2 % ointment Topical (Top), 3 times daily    niacin 500 mg, Daily    niacin 500 mg    nystatin (MYCOSTATIN) cream 2 times daily    omeprazole 20 mg, Daily    pioglitazone (ACTOS) 15 mg    POLYETHYLENE GLYCOL  20,000 MISC 17 g    potassium chloride SA (K-DUR,KLOR-CON) 20 MEQ tablet 1 tablet, Daily    pravastatin (PRAVACHOL) 20 mg, Oral, Daily    rOPINIRole (REQUIP) 0.5 MG tablet 1 tablet, Nightly    tirzepatide 2.5 mg, Subcutaneous, Every 7 days    tiZANidine (ZANAFLEX) 4 mg    traZODone (DESYREL) 50 mg, Nightly      Assessment & Plan     Coronary artery disease involving native coronary artery of native heart  Patient is on aspirin 81 mg daily, Lopressor 12.5 mg b.i.d., and pravastatin 20 mg q.h.s.  He has no anginal symptoms nor EKG changes noted    Hypertension  Blood pressure is 127/63 mm Hg in the office today.  Continue Imdur 30 mg daily, lisinopril 10 mg daily, Lopressor 12.5 mg b.i.d. avoid high sodium foods.    S/P CABG (coronary artery bypass graft)  History of CABG in   No anginal symptoms reported  Control risk factors including blood pressure, blood sugar, weight, and cholesterol    Diabetes mellitus due to underlying condition with hyperosmolarity without coma, without long-term current use of insulin  He is on multiple medications for diabetes.  He is on statin therapy.    Diverticulosis  Patient states he had to stop taking Plavix due to this condition.    GABBY on CPAP  Continue CPAP use for GABBY    Dyslipidemia  Continue pravastatin.  Patient would benefit from weight loss as he is morbidly obese with a BMI of 45.  Lipids are well controlled      Morbid obesity  Patient's BMI today is 45.75.  He would benefit from weight loss.          No follow-ups on file.              Tonya Beltrán, DINA-C                 [1]   Social History  Tobacco Use    Smoking status: Former     Types: Cigarettes     Start date:      Quit date: 1970     Years since quittin.3    Smokeless tobacco: Never   Substance Use Topics    Alcohol use: Yes     Alcohol/week: 3.0 standard drinks of alcohol     Types: 3 Cans of beer per week    Drug use: Never

## 2025-04-24 NOTE — ASSESSMENT & PLAN NOTE
Continue pravastatin.  Patient would benefit from weight loss as he is morbidly obese with a BMI of 45.  Lipids are well controlled

## 2025-04-24 NOTE — ASSESSMENT & PLAN NOTE
History of CABG in 2001  No anginal symptoms reported  Control risk factors including blood pressure, blood sugar, weight, and cholesterol

## 2025-04-24 NOTE — ASSESSMENT & PLAN NOTE
Addended by: Olga Gerardo on: 9/12/2022 12:33 PM     Modules accepted: Orders Continue CPAP use for GABBY

## 2025-04-28 ENCOUNTER — HOSPITAL ENCOUNTER (OUTPATIENT)
Dept: RADIOLOGY | Facility: HOSPITAL | Age: 75
Discharge: HOME | End: 2025-04-28
Attending: NURSE PRACTITIONER
Payer: MEDICARE

## 2025-04-28 ENCOUNTER — RESULTS FOLLOW-UP (OUTPATIENT)
Dept: CARDIOLOGY | Facility: HOSPITAL | Age: 75
End: 2025-04-28

## 2025-04-28 DIAGNOSIS — R09.89 RIGHT CAROTID BRUIT: ICD-10-CM

## 2025-04-28 PROCEDURE — 93880 EXTRACRANIAL BILAT STUDY: CPT | Mod: TC,PO

## 2025-04-28 PROCEDURE — 93880 EXTRACRANIAL BILAT STUDY: CPT | Mod: 26,,, | Performed by: RADIOLOGY

## 2025-05-12 DIAGNOSIS — I10 PRIMARY HYPERTENSION: Primary | ICD-10-CM

## 2025-05-13 RX ORDER — ISOSORBIDE MONONITRATE 30 MG/1
30 TABLET, EXTENDED RELEASE ORAL DAILY
Qty: 30 TABLET | Refills: 11 | Status: SHIPPED | OUTPATIENT
Start: 2025-05-13 | End: 2026-05-13

## 2025-09-03 ENCOUNTER — TELEPHONE (OUTPATIENT)
Dept: CARDIOLOGY | Facility: CLINIC | Age: 75
End: 2025-09-03
Payer: MEDICARE

## 2025-09-04 ENCOUNTER — TELEPHONE (OUTPATIENT)
Dept: CARDIOLOGY | Facility: CLINIC | Age: 75
End: 2025-09-04
Payer: MEDICARE

## (undated) DEVICE — SHEATH INTRODUCER 5FR 10CM

## (undated) DEVICE — GUIDEWIRE INQWIRE SE 3MM JTIP

## (undated) DEVICE — WIRE GUIDE HI TRQ BAL

## (undated) DEVICE — KIT INFLATION MERIT (IN8152, HP9200E)

## (undated) DEVICE — GUIDEWIRE J TIP BMW .014X190

## (undated) DEVICE — CATH LNCHR JR40 6F 110CM

## (undated) DEVICE — GUIDEWIRE J TIP EXCHANGE FIXED CORE 260

## (undated) DEVICE — CATH EAGLE EYE PLATINUM

## (undated) DEVICE — CATHETER DIAGNOSTIC DXTERITY 6FR AL 1

## (undated) DEVICE — CATHETER GUIDE LAUNCHER JR4 6FX110

## (undated) DEVICE — CATH DXTERITY JL40 100CM 6FR

## (undated) DEVICE — GUIDEWIRE RUNTHROUGH EF 180CM

## (undated) DEVICE — CATHETER DIAGNOSTIC DXTERITY 6FR IMA

## (undated) DEVICE — SHEATH PINNACLE 6FRX10CM W/GUIDEWIRE

## (undated) DEVICE — INFLATOR ADVANTAGE ENCORE 26

## (undated) DEVICE — VALVE BLEEDBACK CONTROL 1003330

## (undated) DEVICE — CATHETER EXPO MLTPK 6FR 100X110CM

## (undated) DEVICE — SHEATH DESTINATION 6FR 45CM

## (undated) DEVICE — Device

## (undated) DEVICE — GUIDEWIRE J 3MM .035IN 150

## (undated) DEVICE — CATHETER DIAGNOSTIC DXTERITY 6FR AR1.0

## (undated) DEVICE — CATHETER GUIDE LAUNCHER MP  6FX110

## (undated) DEVICE — KIT MINI STICK MAX 5F 21GX10CM

## (undated) DEVICE — GUIDE LAUNCH 6FR AL 1.0

## (undated) DEVICE — GUIDE WIRE BMW 014 X190

## (undated) DEVICE — SHEATH INTRODUCER 6FR 11CM

## (undated) DEVICE — GUIDEWIRE AMPLATZ .035X260

## (undated) DEVICE — CATH NC EMERGE MR 3.5X15MM

## (undated) DEVICE — CATHETER GUIDE LAUNCHER AR1 6FX110

## (undated) DEVICE — CATH DXTERITY JR40 100CM 6FR

## (undated) DEVICE — CATH DXTERITY IMA 100CM 6FR